# Patient Record
Sex: FEMALE | Race: WHITE | Employment: OTHER | URBAN - METROPOLITAN AREA
[De-identification: names, ages, dates, MRNs, and addresses within clinical notes are randomized per-mention and may not be internally consistent; named-entity substitution may affect disease eponyms.]

---

## 2018-09-04 ENCOUNTER — HOSPITAL ENCOUNTER (OUTPATIENT)
Age: 83
Setting detail: OBSERVATION
Discharge: HOME | End: 2018-09-05
Attending: INTERNAL MEDICINE | Admitting: INTERNAL MEDICINE
Payer: MEDICARE

## 2018-09-04 PROBLEM — I10 ACCELERATED HYPERTENSION: Status: ACTIVE | Noted: 2018-09-04

## 2018-09-04 PROCEDURE — 65660000000 HC RM CCU STEPDOWN

## 2018-09-04 PROCEDURE — 36415 COLL VENOUS BLD VENIPUNCTURE: CPT

## 2018-09-04 PROCEDURE — 84484 ASSAY OF TROPONIN QUANT: CPT

## 2018-09-04 PROCEDURE — 74011250637 HC RX REV CODE- 250/637: Performed by: NURSE PRACTITIONER

## 2018-09-04 PROCEDURE — 99218 HC RM OBSERVATION: CPT

## 2018-09-04 RX ORDER — AMLODIPINE BESYLATE 5 MG/1
5 TABLET ORAL
Status: COMPLETED | OUTPATIENT
Start: 2018-09-04 | End: 2018-09-04

## 2018-09-04 RX ORDER — ACETAMINOPHEN 325 MG/1
650 TABLET ORAL
Status: DISCONTINUED | OUTPATIENT
Start: 2018-09-04 | End: 2018-09-05 | Stop reason: HOSPADM

## 2018-09-04 RX ORDER — SODIUM CHLORIDE 0.9 % (FLUSH) 0.9 %
5-10 SYRINGE (ML) INJECTION EVERY 8 HOURS
Status: DISCONTINUED | OUTPATIENT
Start: 2018-09-04 | End: 2018-09-05 | Stop reason: HOSPADM

## 2018-09-04 RX ORDER — HYDRALAZINE HYDROCHLORIDE 50 MG/1
50 TABLET, FILM COATED ORAL 2 TIMES DAILY
Status: DISCONTINUED | OUTPATIENT
Start: 2018-09-05 | End: 2018-09-05 | Stop reason: HOSPADM

## 2018-09-04 RX ORDER — HYDRALAZINE HYDROCHLORIDE 20 MG/ML
10 INJECTION INTRAMUSCULAR; INTRAVENOUS
Status: DISCONTINUED | OUTPATIENT
Start: 2018-09-04 | End: 2018-09-05 | Stop reason: HOSPADM

## 2018-09-04 RX ORDER — LANOLIN ALCOHOL/MO/W.PET/CERES
400 CREAM (GRAM) TOPICAL 2 TIMES DAILY
Status: DISCONTINUED | OUTPATIENT
Start: 2018-09-05 | End: 2018-09-05 | Stop reason: HOSPADM

## 2018-09-04 RX ORDER — AMLODIPINE BESYLATE 5 MG/1
5 TABLET ORAL DAILY
Status: DISCONTINUED | OUTPATIENT
Start: 2018-09-05 | End: 2018-09-05 | Stop reason: HOSPADM

## 2018-09-04 RX ORDER — ONDANSETRON 2 MG/ML
4 INJECTION INTRAMUSCULAR; INTRAVENOUS
Status: DISCONTINUED | OUTPATIENT
Start: 2018-09-04 | End: 2018-09-05 | Stop reason: HOSPADM

## 2018-09-04 RX ORDER — SPIRONOLACTONE 25 MG/1
12.5 TABLET ORAL DAILY
Status: DISCONTINUED | OUTPATIENT
Start: 2018-09-05 | End: 2018-09-05 | Stop reason: HOSPADM

## 2018-09-04 RX ORDER — LOSARTAN POTASSIUM 50 MG/1
100 TABLET ORAL DAILY
Status: DISCONTINUED | OUTPATIENT
Start: 2018-09-05 | End: 2018-09-05 | Stop reason: HOSPADM

## 2018-09-04 RX ORDER — FAMOTIDINE 20 MG/1
20 TABLET, FILM COATED ORAL 2 TIMES DAILY
Status: DISCONTINUED | OUTPATIENT
Start: 2018-09-05 | End: 2018-09-05

## 2018-09-04 RX ORDER — SODIUM CHLORIDE 0.9 % (FLUSH) 0.9 %
5-10 SYRINGE (ML) INJECTION AS NEEDED
Status: DISCONTINUED | OUTPATIENT
Start: 2018-09-04 | End: 2018-09-05 | Stop reason: HOSPADM

## 2018-09-04 RX ORDER — EZETIMIBE 10 MG/1
10 TABLET ORAL DAILY
Status: DISCONTINUED | OUTPATIENT
Start: 2018-09-05 | End: 2018-09-05 | Stop reason: HOSPADM

## 2018-09-04 RX ORDER — NITROGLYCERIN 0.4 MG/1
0.4 TABLET SUBLINGUAL
Status: DISCONTINUED | OUTPATIENT
Start: 2018-09-04 | End: 2018-09-05 | Stop reason: HOSPADM

## 2018-09-04 RX ADMIN — AMLODIPINE BESYLATE 5 MG: 5 TABLET ORAL at 23:00

## 2018-09-04 RX ADMIN — Medication 5 ML: at 23:00

## 2018-09-04 NOTE — IP AVS SNAPSHOT
[address] 
[phone] Patient: Man Espinal MRN: [id number] About your hospitalization You were admitted on:  2018 You last received care in the:  [facility] 3 TELEMETRY You were discharged on:  2018 Why you were hospitalized Your primary diagnosis was:  Chest Pain Your diagnoses also included:  Palpitations, Hld (Hyperlipidemia), Benign Essential Htn, Accelerated Hypertension Follow-up Information Follow up With Details Comments Contact Info Rahul Kimball MD   [id number] [phone] Soraya Daily MD On 2018 At 230 with Dr Holli Davis on 2018 [address] [place] [id number] 
[phone] Discharge Orders None A check gudelia indicates which time of day the medication should be taken. My Medications CONTINUE taking these medications Instructions Each Dose to Equal  
 Morning Noon Evening Bedtime  
 amLODIPine 5 mg tablet Commonly known as:  Mesha Daisy Take 1 Tab by mouth daily. 5 mg Calcium-Cholecalciferol (D3) 500 mg(1,250mg) -400 unit Tab Take  by mouth. hydrALAZINE 50 mg tablet Commonly known as:  APRESOLINE Take 1 Tab by mouth two (2) times a day. 50 mg  
    
  
   
   
  
   
  
 latanoprost 0.005 % ophthalmic solution Commonly known as:  Chris Denney Administer 1 Drop to both eyes nightly. 1 Drop  
    
   
   
   
  
  
 losartan 100 mg tablet Commonly known as:  COZAAR Take 1 Tab by mouth daily. 100 mg  
    
  
   
   
   
  
 magnesium oxide 400 mg tablet Commonly known as:  MAG-OX Take 400 mg by mouth two (2) times a day. 400 mg  
    
  
   
   
  
   
  
 multivitamin tablet Commonly known as:  ONE A DAY  
   
 Take 1 Tab by mouth daily. 1 Tab  
    
  
   
   
   
  
 omega-3 fatty acids-vitamin e 1,000 mg Cap Take 1 Cap by mouth. 1 Cap PREMARIN 0.625 mg/gram vaginal cream  
Generic drug:  conjugated estrogens APPLY VAGINALLY AS DIRECTED MON, WED, FRI  
     
   
   
   
  
 raNITIdine 150 mg tablet Commonly known as:  ZANTAC Take 150 mg by mouth two (2) times a day. 150 mg  
    
  
   
   
  
   
  
 spironolactone 25 mg tablet Commonly known as:  ALDACTONE Take 0.5 Tabs by mouth daily. 12.5 mg  
    
  
   
   
   
  
 vitamin E 400 unit capsule Commonly known as:  [address] Take 400 Units by mouth daily. 400 Units ZETIA 10 mg tablet Generic drug:  ezetimibe Take 10 mg by mouth daily. 10 mg  
    
  
   
   
   
  
  
STOP taking these medications   
 aspirin delayed-release 81 mg tablet  
   
  
 metoprolol succinate 25 mg XL tablet Commonly known as:  TOPROL-XL Discharge Instructions Chest Pain: Care Instructions Your Care Instructions There are many things that can cause chest pain. Some are not serious and will get better on their own in a few days. But some kinds of chest pain need more testing and treatment. Your doctor may have recommended a follow-up visit in the next 8 to 12 hours. If you are not getting better, you may need more tests or treatment. Even though your doctor has released you, you still need to watch for any problems. The doctor carefully checked you, but sometimes problems can develop later. If you have new symptoms or if your symptoms do not get better, get medical care right away. If you have worse or different chest pain or pressure that lasts more than 5 minutes or you passed out (lost consciousness), call 911 or seek other emergency help right away. A medical visit is only one step in your treatment.  Even if you feel better, you still need to do what your doctor recommends, such as going to all suggested follow-up appointments and taking medicines exactly as directed. This will help you recover and help prevent future problems. How can you care for yourself at home? · Rest until you feel better. · Take your medicine exactly as prescribed. Call your doctor if you think you are having a problem with your medicine. · Do not drive after taking a prescription pain medicine. When should you call for help? Call 911 if: 
  · You passed out (lost consciousness).  
  · You have severe difficulty breathing.  
  · You have symptoms of a heart attack. These may include: ¨ Chest pain or pressure, or a strange feeling in your chest. 
¨ Sweating. ¨ Shortness of breath. ¨ Nausea or vomiting. ¨ Pain, pressure, or a strange feeling in your back, neck, jaw, or upper belly or in one or both shoulders or arms. ¨ Lightheadedness or sudden weakness. ¨ A fast or irregular heartbeat. After you call 911, the  may tell you to chew 1 adult-strength or 2 to 4 low-dose aspirin. Wait for an ambulance. Do not try to drive yourself.  
 Call your doctor today if: 
  · You have any trouble breathing.  
  · Your chest pain gets worse.  
  · You are dizzy or lightheaded, or you feel like you may faint.  
  · You are not getting better as expected.  
  · You are having new or different chest pain. Where can you learn more? Go to http://sherice-ryder.info/. Enter A120 in the search box to learn more about \"Chest Pain: Care Instructions. \" Current as of: November 20, 2017 Content Version: 11.7 © 6095-9631 Potential. Care instructions adapted under license by [employer] (which disclaims liability or warranty for this information).  If you have questions about a medical condition or this instruction, always ask your healthcare professional. [employer]ina [employer] disclaims any warranty or liability for your use of this information. [facility] Announcement We are excited to announce that we are making your provider's discharge notes available to you in [facility]. You will see these notes when they are completed and signed by the physician that discharged you from your recent hospital stay. If you have any questions or concerns about any information you see in [facility], please call the Health Information Department where you were seen or reach out to your Primary Care Provider for more information about your plan of care. Introducing [facility]! Dear Josef Thompson: 
Thank you for requesting a [facility] account. Our records indicate that you already have an active [facility] account. You can access your account anytime at [email or web address] Did you know that you can access your hospital and ER discharge instructions at any time in [facility]? You can also review all of your test results from your hospital stay or ER visit. Additional Information If you have questions, please visit the Frequently Asked Questions section of the [facility] website at [email or web address] Remember, [facility] is NOT to be used for urgent needs. For medical emergencies, dial 911. Now available from your iPhone and Android! Introducing Stefano Mayorga As a [employer] patient, I wanted to make you aware of our electronic visit tool called Stefano Mayorga. [employer] 24/7 allows you to connect within minutes with a medical provider 24 hours a day, seven days a week via a mobile device or tablet or logging into a secure website from your computer. You can access Stefano Mayorga from anywhere in the [place].  
 
A virtual visit might be right for you when you have a simple condition and feel like you just dont want to get out of bed, or cant get away from work for an appointment, when your regular [facility] provider is not available (evenings, weekends or holidays), or when youre out of town and need minor care. Electronic visits cost only $49 and if the [employer] [facility] 24/7 provider determines a prescription is needed to treat your condition, one can be electronically transmitted to a nearby pharmacy*. Please take a moment to enroll today if you have not already done so. The enrollment process is free and takes just a few minutes. To enroll, please download the Adfaces[employer]ciValue cecy to your tablet or phone, or visit [email or web address] to enroll on your computer. And, as an [address] [place] [address] patient with a AnyPerk account, the results of your visits will be scanned into your electronic medical record and your primary care provider will be able to view the scanned results. We urge you to continue to see your regular [facility] provider for your ongoing medical care. And while your primary care provider may not be the one available when you seek a [facility] virtual visit, the peace of mind you get from getting a real diagnosis real time can be priceless. For more information on [facility], view our Frequently Asked Questions (FAQs) at [email or web address]. Sincerely, 
 
Marie Florez MD 
Chief Medical Officer [facility] Lexie Chopra *:  certain medications cannot be prescribed via [facility] Providers Seen During Your Hospitalization Provider Specialty Primary office phone Yoko Landeros MD Cardiology [phone] Your Primary Care Physician (PCP) Primary Care Physician Office Phone Office Fax Zuleika Garibay [facility] [phone] You are allergic to the following Allergen Reactions Cephalexin Unknown (comments) Codeine Unknown (comments) Lisinopril Unknown (comments) Sulfa (Sulfonamide Antibiotics) Unknown (comments) Alfonzo (Aliskiren-Amlodipine) Unknown (comments) Recent Documentation Height Weight Breastfeeding? BMI Smoking Status 1.6 m 54.4 kg No 21.24 kg/m2 Never Smoker Emergency Contacts Name Discharge Info Relation Home Work Mobile Cipriano Cid  Daughter [21] [phone] Patient Belongings The following personal items are in your possession at time of discharge: 
  Dental Appliances: None  Visual Aid: Glasses, At bedside      Home Medications: Sent home   Jewelry: Ring, Watch, With patient  Clothing: None    Other Valuables: Cell Phone Please provide this summary of care documentation to your next provider. Signatures-by signing, you are acknowledging that this After Visit Summary has been reviewed with you and you have received a copy. Patient Signature:  ____________________________________________________________ Date:  ____________________________________________________________  
  
Naina Scruggss Provider Signature:  ____________________________________________________________ Date:  ____________________________________________________________

## 2018-09-04 NOTE — IP AVS SNAPSHOT
[address] 
[phone] Patient: Matias Trevino MRN: [id number] A check gudelia indicates which time of day the medication should be taken. My Medications CONTINUE taking these medications Instructions Each Dose to Equal  
 Morning Noon Evening Bedtime  
 amLODIPine 5 mg tablet Commonly known as:  Lennis Eagles Take 1 Tab by mouth daily. 5 mg Calcium-Cholecalciferol (D3) 500 mg(1,250mg) -400 unit Tab Take  by mouth. hydrALAZINE 50 mg tablet Commonly known as:  APRESOLINE Take 1 Tab by mouth two (2) times a day. 50 mg  
    
  
   
   
  
   
  
 latanoprost 0.005 % ophthalmic solution Commonly known as:  Ute Radha Administer 1 Drop to both eyes nightly. 1 Drop  
    
   
   
   
  
  
 losartan 100 mg tablet Commonly known as:  COZAAR Take 1 Tab by mouth daily. 100 mg  
    
  
   
   
   
  
 magnesium oxide 400 mg tablet Commonly known as:  MAG-OX Take 400 mg by mouth two (2) times a day. 400 mg  
    
  
   
   
  
   
  
 multivitamin tablet Commonly known as:  ONE A DAY Take 1 Tab by mouth daily. 1 Tab  
    
  
   
   
   
  
 omega-3 fatty acids-vitamin e 1,000 mg Cap Take 1 Cap by mouth. 1 Cap PREMARIN 0.625 mg/gram vaginal cream  
Generic drug:  conjugated estrogens APPLY VAGINALLY AS DIRECTED MON, WED, FRI  
     
   
   
   
  
 raNITIdine 150 mg tablet Commonly known as:  ZANTAC Take 150 mg by mouth two (2) times a day. 150 mg  
    
  
   
   
  
   
  
 spironolactone 25 mg tablet Commonly known as:  ALDACTONE Take 0.5 Tabs by mouth daily. 12.5 mg  
    
  
   
   
   
  
 vitamin E 400 unit capsule Commonly known as:  [address] Take 400 Units by mouth daily. 400 Units ZETIA 10 mg tablet Generic drug:  ezetimibe Take 10 mg by mouth daily. 10 mg  
    
  
   
   
   
  
  
STOP taking these medications   
 aspirin delayed-release 81 mg tablet  
   
  
 metoprolol succinate 25 mg XL tablet Commonly known as:  TOPROL-XL

## 2018-09-05 VITALS
HEART RATE: 56 BPM | HEIGHT: 63 IN | TEMPERATURE: 97.8 F | OXYGEN SATURATION: 96 % | SYSTOLIC BLOOD PRESSURE: 137 MMHG | BODY MASS INDEX: 21.25 KG/M2 | DIASTOLIC BLOOD PRESSURE: 49 MMHG | WEIGHT: 119.9 LBS | RESPIRATION RATE: 18 BRPM

## 2018-09-05 LAB
ANION GAP SERPL CALC-SCNC: 9 MMOL/L (ref 7–16)
BUN SERPL-MCNC: 21 MG/DL (ref 8–23)
CALCIUM SERPL-MCNC: 8.9 MG/DL (ref 8.3–10.4)
CHLORIDE SERPL-SCNC: 102 MMOL/L (ref 98–107)
CHOLEST SERPL-MCNC: 205 MG/DL
CO2 SERPL-SCNC: 26 MMOL/L (ref 21–32)
CREAT SERPL-MCNC: 1.22 MG/DL (ref 0.6–1)
ERYTHROCYTE [DISTWIDTH] IN BLOOD BY AUTOMATED COUNT: 12.9 %
GLUCOSE SERPL-MCNC: 86 MG/DL (ref 65–100)
HCT VFR BLD AUTO: 33.4 % (ref 35.8–46.3)
HDLC SERPL-MCNC: 61 MG/DL (ref 40–60)
HDLC SERPL: 3.4 {RATIO}
HGB BLD-MCNC: 11.3 G/DL (ref 11.7–15.4)
LDLC SERPL CALC-MCNC: 129 MG/DL
LIPID PROFILE,FLP: ABNORMAL
MCH RBC QN AUTO: 31.5 PG (ref 26.1–32.9)
MCHC RBC AUTO-ENTMCNC: 33.8 G/DL (ref 31.4–35)
MCV RBC AUTO: 93 FL (ref 79.6–97.8)
NRBC # BLD: 0 K/UL (ref 0–0.2)
PLATELET # BLD AUTO: 283 K/UL (ref 150–450)
PMV BLD AUTO: 9.9 FL (ref 9.4–12.3)
POTASSIUM SERPL-SCNC: 4 MMOL/L (ref 3.5–5.1)
RBC # BLD AUTO: 3.59 M/UL (ref 4.05–5.2)
SODIUM SERPL-SCNC: 137 MMOL/L (ref 136–145)
TRIGL SERPL-MCNC: 75 MG/DL (ref 35–150)
TROPONIN I SERPL-MCNC: 0.02 NG/ML (ref 0.02–0.05)
TROPONIN I SERPL-MCNC: <0.02 NG/ML (ref 0.02–0.05)
VLDLC SERPL CALC-MCNC: 15 MG/DL (ref 6–23)
WBC # BLD AUTO: 6.6 K/UL (ref 4.3–11.1)

## 2018-09-05 PROCEDURE — 85027 COMPLETE CBC AUTOMATED: CPT

## 2018-09-05 PROCEDURE — 80061 LIPID PANEL: CPT

## 2018-09-05 PROCEDURE — 99218 HC RM OBSERVATION: CPT

## 2018-09-05 PROCEDURE — 36415 COLL VENOUS BLD VENIPUNCTURE: CPT

## 2018-09-05 PROCEDURE — 84484 ASSAY OF TROPONIN QUANT: CPT

## 2018-09-05 PROCEDURE — 80048 BASIC METABOLIC PNL TOTAL CA: CPT

## 2018-09-05 PROCEDURE — 74011250637 HC RX REV CODE- 250/637: Performed by: NURSE PRACTITIONER

## 2018-09-05 PROCEDURE — 93306 TTE W/DOPPLER COMPLETE: CPT

## 2018-09-05 RX ORDER — FAMOTIDINE 20 MG/1
20 TABLET, FILM COATED ORAL DAILY
Status: DISCONTINUED | OUTPATIENT
Start: 2018-09-06 | End: 2018-09-05 | Stop reason: HOSPADM

## 2018-09-05 RX ADMIN — NITROGLYCERIN 1 INCH: 20 OINTMENT TOPICAL at 06:00

## 2018-09-05 RX ADMIN — Medication 400 MG: at 08:38

## 2018-09-05 RX ADMIN — SPIRONOLACTONE 12.5 MG: 25 TABLET ORAL at 08:39

## 2018-09-05 RX ADMIN — NITROGLYCERIN 1 INCH: 20 OINTMENT TOPICAL at 11:58

## 2018-09-05 RX ADMIN — EZETIMIBE 10 MG: 10 TABLET ORAL at 08:38

## 2018-09-05 RX ADMIN — Medication 5 ML: at 06:00

## 2018-09-05 RX ADMIN — Medication 10 ML: at 13:28

## 2018-09-05 RX ADMIN — NITROGLYCERIN 1 INCH: 20 OINTMENT TOPICAL at 00:00

## 2018-09-05 RX ADMIN — LOSARTAN POTASSIUM 100 MG: 50 TABLET ORAL at 08:38

## 2018-09-05 RX ADMIN — FAMOTIDINE 20 MG: 20 TABLET ORAL at 08:38

## 2018-09-05 RX ADMIN — AMLODIPINE BESYLATE 5 MG: 5 TABLET ORAL at 08:38

## 2018-09-05 RX ADMIN — HYDRALAZINE HYDROCHLORIDE 50 MG: 50 TABLET, FILM COATED ORAL at 08:38

## 2018-09-05 NOTE — DISCHARGE SUMMARY
[facility] Cardiology Discharge Summary     Patient ID:  Alton Cameron  [id number]  35 y.o.  8/17/1928    Admit date: 9/4/2018    Discharge date:  9/5/18    Admitting Physician: John Houser MD     Discharge Physician: Rito Ruggiero NP/Dr. Matthias White    Admission Diagnoses: chest pain  Chest pain    Discharge Diagnoses:   Patient Active Problem List    Diagnosis Date Noted    Accelerated hypertension 09/04/2018    Dizziness/Vertigo 07/01/2016    Benign essential HTN 05/11/2016    Edema 05/11/2016    Palpitations 05/11/2016    Chest pain 05/11/2016    HLD (hyperlipidemia)        Cardiology Procedures this admission:  EchoCardiogram  Consults: None    Hospital Course: Patient presented to the emergency department at [facility] with complaints of chest discomfort (per ER but patient denies) and palpitations. Patient reports palpitations after walking from her mailbox to her house earlier today. She states that she was seen by pre-op assessment at the hospital earlier today and felt fine. After getting home from the hospital her symptoms started and lasted about 5 mins. She reports improvement in symptoms with rest. States that she was able to take a nap and felt fine when she woke up. Unable to verify vital signs upon arrival to the ER, however, she was significantly hypertensive prior to transfer to [facility] with BP of 201/47. While in the ER, EKG showed SB with LBBB. Initial troponin was negative. She was transferred to [facility]. Upon arrival to [facility], she denies chest pain currently or in the recent past. Symptoms that are described are palpitations, \"fluttering\" in her chest, chest felt like it was racing/pounding. She reports some weakness with initial symptoms. She was admitted to telemetry with serial troponins and echo. Her troponins were all negative and echo showed hyperdynamic EF at 65-70% and mild AR.  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 Her toprol was placed on hold. The afternoon of 9/5/18, the patient was feeling much better and wanting to go home  The patient was seen and examined by Dr. Brice Rutledge and was determined stable and ready for discharge home. The patient was instructed on the importance of medication compliance and outpatient follow up. The patient will follow up with [facility] Cardiology Dr. Hardik Casas on 9/19/18 at 230 pm in [place] office. The patient's asa was stopped last week pending bladder tuck surgery on Monday. Will continue to hold asa with pending surgery and Toprol with her bradycardia. DISPOSITION: The patient is being discharged home in stable condition on a low saturated fat, low cholesterol and low salt diet. The patient is instructed to advance activities as tolerated to the limit of fatigue or shortness of breath. The patient is instructed to call the office or return to the ER for immediate evaluation for any severe shortness of breath, chest pain, prolonged palpitations, near syncope or syncope. Discharge Exam:   Visit Vitals    /49    Pulse (!) 56    Temp 97.8 °F (36.6 °C)    Resp 18    Ht 5' 3\" (1.6 m)    Wt 54.4 kg (119 lb 14.4 oz)    SpO2 96%    Breastfeeding No    BMI 21.24 kg/m2     Patient has been seen by Dr. Brice Rutledge: see his progress note for exam details.     Recent Results (from the past 24 hour(s))   TROPONIN I    Collection Time: 09/04/18 11:10 PM   Result Value Ref Range    Troponin-I, Qt. <0.02 (L) 0.02 - 0.05 NG/ML   LIPID PANEL    Collection Time: 09/05/18  3:35 AM   Result Value Ref Range    LIPID PROFILE          Cholesterol, total 205 (H) <200 MG/DL    Triglyceride 75 35 - 150 MG/DL    HDL Cholesterol 61 (H) 40 - 60 MG/DL    LDL, calculated 129 (H) <100 MG/DL    VLDL, calculated 15 6.0 - 23.0 MG/DL    CHOL/HDL Ratio 3.4     TROPONIN I    Collection Time: 09/05/18  3:35 AM   Result Value Ref Range    Troponin-I, Qt. 0.02 0.02 - 5.80 NG/ML   METABOLIC PANEL, BASIC    Collection Time: 09/05/18  3:35 AM   Result Value Ref Range    Sodium 137 136 - 145 mmol/L    Potassium 4.0 3.5 - 5.1 mmol/L    Chloride 102 98 - 107 mmol/L    CO2 26 21 - 32 mmol/L    Anion gap 9 7 - 16 mmol/L    Glucose 86 65 - 100 mg/dL    BUN 21 8 - 23 MG/DL    Creatinine 1.22 (H) 0.6 - 1.0 MG/DL    GFR est AA 53 (L) >60 ml/min/1.73m2    GFR est non-AA 44 (L) >60 ml/min/1.73m2    Calcium 8.9 8.3 - 10.4 MG/DL   CBC W/O DIFF    Collection Time: 09/05/18  3:35 AM   Result Value Ref Range    WBC 6.6 4.3 - 11.1 K/uL    RBC 3.59 (L) 4.05 - 5.2 M/uL    HGB 11.3 (L) 11.7 - 15.4 g/dL    HCT 33.4 (L) 35.8 - 46.3 %    MCV 93.0 79.6 - 97.8 FL    MCH 31.5 26.1 - 32.9 PG    MCHC 33.8 31.4 - 35.0 g/dL    RDW 12.9 %    PLATELET 199 608 - 871 K/uL    MPV 9.9 9.4 - 12.3 FL    ABSOLUTE NRBC 0.00 0.0 - 0.2 K/uL         Patient Instructions:     Current Discharge Medication List      CONTINUE these medications which have NOT CHANGED    Details   PREMARIN 0.625 mg/gram vaginal cream APPLY VAGINALLY AS DIRECTED MON, WED, FRI  Refills: 2      hydrALAZINE (APRESOLINE) 50 mg tablet Take 1 Tab by mouth two (2) times a day. Qty: 90 Tab, Refills: 3      amLODIPine (NORVASC) 5 mg tablet Take 1 Tab by mouth daily. Qty: 90 Tab, Refills: 3      spironolactone (ALDACTONE) 25 mg tablet Take 0.5 Tabs by mouth daily. Qty: 90 Tab, Refills: 3      losartan (COZAAR) 100 mg tablet Take 1 Tab by mouth daily. Qty: 90 Tab, Refills: 3      ezetimibe (ZETIA) 10 mg tablet Take 10 mg by mouth daily. latanoprost (XALATAN) 0.005 % ophthalmic solution Administer 1 Drop to both eyes nightly. ranitidine (ZANTAC) 150 mg tablet Take 150 mg by mouth two (2) times a day. vitamin E (AQUA GEMS) 400 unit capsule Take 400 Units by mouth daily. omega-3 fatty acids-vitamin e 1,000 mg cap Take 1 Cap by mouth.       Calcium-Cholecalciferol, D3, 500 mg(1,250mg) -400 unit tab Take  by mouth.      multivitamin (ONE A DAY) tablet Take 1 Tab by mouth daily. magnesium oxide (MAG-OX) 400 mg tablet Take 400 mg by mouth two (2) times a day.          STOP taking these medications       metoprolol succinate (TOPROL-XL) 25 mg XL tablet Comments:   Reason for Stopping:         aspirin delayed-release 81 mg tablet Comments:   Reason for Stopping:                 Signed:  Faby Jones NP  9/5/2018  4:26 PM

## 2018-09-05 NOTE — DISCHARGE INSTRUCTIONS
Chest Pain: Care Instructions  Your Care Instructions    There are many things that can cause chest pain. Some are not serious and will get better on their own in a few days. But some kinds of chest pain need more testing and treatment. Your doctor may have recommended a follow-up visit in the next 8 to 12 hours. If you are not getting better, you may need more tests or treatment. Even though your doctor has released you, you still need to watch for any problems. The doctor carefully checked you, but sometimes problems can develop later. If you have new symptoms or if your symptoms do not get better, get medical care right away. If you have worse or different chest pain or pressure that lasts more than 5 minutes or you passed out (lost consciousness), call 911 or seek other emergency help right away. A medical visit is only one step in your treatment. Even if you feel better, you still need to do what your doctor recommends, such as going to all suggested follow-up appointments and taking medicines exactly as directed. This will help you recover and help prevent future problems. How can you care for yourself at home? · Rest until you feel better. · Take your medicine exactly as prescribed. Call your doctor if you think you are having a problem with your medicine. · Do not drive after taking a prescription pain medicine. When should you call for help? Call 911 if:    · You passed out (lost consciousness).     · You have severe difficulty breathing.     · You have symptoms of a heart attack. These may include:  ¨ Chest pain or pressure, or a strange feeling in your chest.  ¨ Sweating. ¨ Shortness of breath. ¨ Nausea or vomiting. ¨ Pain, pressure, or a strange feeling in your back, neck, jaw, or upper belly or in one or both shoulders or arms. ¨ Lightheadedness or sudden weakness. ¨ A fast or irregular heartbeat.   After you call 911, the  may tell you to chew 1 adult-strength or 2 to 4 low-dose aspirin. Wait for an ambulance. Do not try to drive yourself.    Call your doctor today if:    · You have any trouble breathing.     · Your chest pain gets worse.     · You are dizzy or lightheaded, or you feel like you may faint.     · You are not getting better as expected.     · You are having new or different chest pain. Where can you learn more? Go to http://sherice-ryder.info/. Enter A120 in the search box to learn more about \"Chest Pain: Care Instructions. \"  Current as of: November 20, 2017  Content Version: 11.7  © 8231-5007 [employer]. Care instructions adapted under license by [employer] (which disclaims liability or warranty for this information). If you have questions about a medical condition or this instruction, always ask your healthcare professional. [address] any warranty or liability for your use of this information.

## 2018-09-05 NOTE — PROGRESS NOTES
Discharge instructions reviewed with patient. No new prescriptions. Opportunity for questions provided. Patient voiced understanding of all discharge instructions. IV(s) and heart monitor removed - heart monitor returned to monitor room.

## 2018-09-05 NOTE — PROGRESS NOTES
Dual skin assessment completed with second RN reveals scattered \"skin tags\" over body, skin is intact. Heels and sacrum visualized with no abnormalities.

## 2018-09-05 NOTE — PROGRESS NOTES
Pt admitted to 3rd floor tele for CP. CM met with pt to discuss CM needs & DCP. Pt is A&Ox4. Pt is indep at home with all ADLS. Pt lives alone, family lives nearby. Pt has the following DME: rollator, walker, cane, WC, SC, bathroom hand rails; no further DME needs. Pt has no difficulty with obtaining medications or transport. Pt has [facility] to [facility] in the past year, has also had [facility]. Declined need for HH. DCP home with family assistance. No further needs noted. CM to continue to monitor. Care Management Interventions  PCP Verified by CM: Yes  Last Visit to PCP: 08/29/18  Mode of Transport at Discharge:  Other (see comment) (Daughter Theodor Co [phone])  Transition of Care Consult (CM Consult): Discharge Planning  Discharge Durable Medical Equipment: No  Physical Therapy Consult: No  Occupational Therapy Consult: No  Speech Therapy Consult: No  Current Support Network: Lives Alone, Family Lives Nearby  Confirm Follow Up Transport: Family  Plan discussed with Pt/Family/Caregiver: Yes  Freedom of Choice Offered: Yes  Discharge Location  Discharge Placement: Home

## 2018-09-05 NOTE — PROGRESS NOTES
Pt assisted to ambulate in hallway. Pt tolerated activity well. Denies CP, SOB and dizziness. Heart rate maintained in the 60's from beginning of walk to end of walk.

## 2018-09-05 NOTE — PROGRESS NOTES
Bedside and Verbal shift change report given to Self and Clare Grey RNs (oncoming nurse) by Ace Painter RN (offgoing nurse).  Report included the following information SBAR, Kardex, Intake/Output, MAR, Recent Results and Cardiac Rhythm NSR to SB.

## 2018-09-14 ENCOUNTER — APPOINTMENT (OUTPATIENT)
Dept: GENERAL RADIOLOGY | Age: 83
End: 2018-09-14
Attending: EMERGENCY MEDICINE
Payer: MEDICARE

## 2018-09-14 ENCOUNTER — HOSPITAL ENCOUNTER (EMERGENCY)
Age: 83
Discharge: HOME | End: 2018-09-14
Attending: EMERGENCY MEDICINE
Payer: MEDICARE

## 2018-09-14 VITALS
TEMPERATURE: 98.6 F | HEART RATE: 62 BPM | HEIGHT: 63 IN | WEIGHT: 123 LBS | DIASTOLIC BLOOD PRESSURE: 68 MMHG | BODY MASS INDEX: 21.79 KG/M2 | OXYGEN SATURATION: 95 % | SYSTOLIC BLOOD PRESSURE: 151 MMHG | RESPIRATION RATE: 16 BRPM

## 2018-09-14 DIAGNOSIS — R07.9 CHEST PAIN, UNSPECIFIED TYPE: ICD-10-CM

## 2018-09-14 DIAGNOSIS — R00.2 PALPITATIONS: Primary | ICD-10-CM

## 2018-09-14 LAB
ALBUMIN SERPL-MCNC: 3.7 G/DL (ref 3.2–4.6)
ALBUMIN/GLOB SERPL: 1 {RATIO} (ref 1.2–3.5)
ALP SERPL-CCNC: 62 U/L (ref 50–136)
ALT SERPL-CCNC: 24 U/L (ref 12–65)
ANION GAP SERPL CALC-SCNC: 10 MMOL/L (ref 7–16)
AST SERPL-CCNC: 21 U/L (ref 15–37)
BASOPHILS # BLD: 0 K/UL (ref 0–0.2)
BASOPHILS NFR BLD: 1 % (ref 0–2)
BILIRUB SERPL-MCNC: 0.7 MG/DL (ref 0.2–1.1)
BUN SERPL-MCNC: 30 MG/DL (ref 8–23)
CALCIUM SERPL-MCNC: 9.3 MG/DL (ref 8.3–10.4)
CHLORIDE SERPL-SCNC: 95 MMOL/L (ref 98–107)
CO2 SERPL-SCNC: 26 MMOL/L (ref 21–32)
CREAT SERPL-MCNC: 1.6 MG/DL (ref 0.6–1)
DIFFERENTIAL METHOD BLD: ABNORMAL
EOSINOPHIL # BLD: 0.2 K/UL (ref 0–0.8)
EOSINOPHIL NFR BLD: 2 % (ref 0.5–7.8)
ERYTHROCYTE [DISTWIDTH] IN BLOOD BY AUTOMATED COUNT: 12.9 %
GLOBULIN SER CALC-MCNC: 3.7 G/DL (ref 2.3–3.5)
GLUCOSE SERPL-MCNC: 81 MG/DL (ref 65–100)
HCT VFR BLD AUTO: 35.2 % (ref 35.8–46.3)
HGB BLD-MCNC: 11.9 G/DL (ref 11.7–15.4)
IMM GRANULOCYTES # BLD: 0 K/UL (ref 0–0.5)
IMM GRANULOCYTES NFR BLD AUTO: 0 % (ref 0–5)
LYMPHOCYTES # BLD: 3.2 K/UL (ref 0.5–4.6)
LYMPHOCYTES NFR BLD: 44 % (ref 13–44)
MAGNESIUM SERPL-MCNC: 2.4 MG/DL (ref 1.8–2.4)
MCH RBC QN AUTO: 31.2 PG (ref 26.1–32.9)
MCHC RBC AUTO-ENTMCNC: 33.8 G/DL (ref 31.4–35)
MCV RBC AUTO: 92.1 FL (ref 79.6–97.8)
MONOCYTES # BLD: 0.7 K/UL (ref 0.1–1.3)
MONOCYTES NFR BLD: 9 % (ref 4–12)
NEUTS SEG # BLD: 3.3 K/UL (ref 1.7–8.2)
NEUTS SEG NFR BLD: 45 % (ref 43–78)
NRBC # BLD: 0 K/UL (ref 0–0.2)
PLATELET # BLD AUTO: 271 K/UL (ref 150–450)
PMV BLD AUTO: 9.7 FL (ref 9.4–12.3)
POTASSIUM SERPL-SCNC: 4.6 MMOL/L (ref 3.5–5.1)
PROT SERPL-MCNC: 7.4 G/DL (ref 6.3–8.2)
RBC # BLD AUTO: 3.82 M/UL (ref 4.05–5.2)
SODIUM SERPL-SCNC: 131 MMOL/L (ref 136–145)
TROPONIN I SERPL-MCNC: 0.02 NG/ML (ref 0.02–0.05)
TROPONIN I SERPL-MCNC: <0.02 NG/ML (ref 0.02–0.05)
TSH SERPL DL<=0.005 MIU/L-ACNC: 3.36 UIU/ML (ref 0.36–3.74)
WBC # BLD AUTO: 7.4 K/UL (ref 4.3–11.1)

## 2018-09-14 PROCEDURE — 99285 EMERGENCY DEPT VISIT HI MDM: CPT | Performed by: EMERGENCY MEDICINE

## 2018-09-14 PROCEDURE — 71046 X-RAY EXAM CHEST 2 VIEWS: CPT

## 2018-09-14 PROCEDURE — 96374 THER/PROPH/DIAG INJ IV PUSH: CPT | Performed by: EMERGENCY MEDICINE

## 2018-09-14 PROCEDURE — 85025 COMPLETE CBC W/AUTO DIFF WBC: CPT

## 2018-09-14 PROCEDURE — 84443 ASSAY THYROID STIM HORMONE: CPT

## 2018-09-14 PROCEDURE — 83735 ASSAY OF MAGNESIUM: CPT

## 2018-09-14 PROCEDURE — 84484 ASSAY OF TROPONIN QUANT: CPT

## 2018-09-14 PROCEDURE — 74011250636 HC RX REV CODE- 250/636: Performed by: EMERGENCY MEDICINE

## 2018-09-14 PROCEDURE — 96361 HYDRATE IV INFUSION ADD-ON: CPT | Performed by: EMERGENCY MEDICINE

## 2018-09-14 PROCEDURE — 80053 COMPREHEN METABOLIC PANEL: CPT

## 2018-09-14 PROCEDURE — 93005 ELECTROCARDIOGRAM TRACING: CPT | Performed by: EMERGENCY MEDICINE

## 2018-09-14 RX ORDER — HYDRALAZINE HYDROCHLORIDE 20 MG/ML
10 INJECTION INTRAMUSCULAR; INTRAVENOUS
Status: COMPLETED | OUTPATIENT
Start: 2018-09-14 | End: 2018-09-14

## 2018-09-14 RX ADMIN — HYDRALAZINE HYDROCHLORIDE 10 MG: 20 INJECTION INTRAMUSCULAR; INTRAVENOUS at 19:35

## 2018-09-14 RX ADMIN — SODIUM CHLORIDE 500 ML: 900 INJECTION, SOLUTION INTRAVENOUS at 19:35

## 2018-09-14 NOTE — ED TRIAGE NOTES
Per patient intermittent mediastinal chest pain that started at 1300. Patient denies gi/gu sx currently. States of sob upon movement.

## 2018-09-14 NOTE — ED PROVIDER NOTES
HPI Comments: Patient is a 77-year-old female presenting with palpitations and chest pain. She reports palpitations began approximately [id number]. She states she does have palpitations and some mild dizziness. She then developed some chest pain with radiation to her back between her shoulders. She reports some mild shortness of breath which has now since resolved. She states back pain is resolved too but has some subsisting right-sided chest pain. No leg swelling. No nausea or vomiting. Patient had an unremarkable  Echocardiogram earlier this month  and cardiac monitor. family reports they called the cardiologist who told him to come to the emergency department because they \"need a cath\". Patient is a 80 y.o. female presenting with chest pain. The history is provided by the patient. No  was used. Chest Pain (Angina) This is a new problem. The problem has been gradually improving. Pertinent negatives include no abdominal pain, no back pain, no cough, no fever, no headaches, no nausea, no shortness of breath and no vomiting. Past Medical History:  
Diagnosis Date  Benign essential HTN 5/11/2016  Chest pain 5/11/2016  Dizziness/Vertigo 7/1/2016  Edema 5/11/2016  HLD (hyperlipidemia)  Palpitations 5/11/2016 No past surgical history on file. Family History:  
Problem Relation Age of Onset  Family history unknown: Yes  
 
 
Social History Social History  Marital status:  Spouse name: N/A  
 Number of children: N/A  
 Years of education: N/A Occupational History  Not on file. Social History Main Topics  Smoking status: Never Smoker  Smokeless tobacco: Never Used  Alcohol use Not on file  Drug use: Not on file  Sexual activity: Not on file Other Topics Concern  Not on file Social History Narrative ALLERGIES: Cephalexin; Codeine;  Lisinopril; Sulfa (sulfonamide antibiotics); and Tekamlo [aliskiren-amlodipine] Review of Systems Constitutional: Negative for fatigue and fever. HENT: Negative for sore throat. Respiratory: Negative for cough, chest tightness and shortness of breath. Cardiovascular: Positive for chest pain. Negative for leg swelling. Gastrointestinal: Negative for abdominal pain, nausea and vomiting. Genitourinary: Negative for dysuria. Musculoskeletal: Negative for back pain. Neurological: Positive for light-headedness. Negative for syncope and headaches. Psychiatric/Behavioral: Negative for confusion. Vitals:  
 09/14/18 1700 BP: 153/56 Pulse: (!) 53 Resp: 18 Temp: 98.6 °F (37 °C) SpO2: 96% Weight: 55.8 kg (123 lb) Height: 5' 3\" (1.6 m) Physical Exam  
Constitutional: She is oriented to person, place, and time. She appears well-developed and well-nourished. No distress. HENT:  
Head: Normocephalic and atraumatic. Eyes: Conjunctivae and EOM are normal. Pupils are equal, round, and reactive to light. Neck: Normal range of motion. Neck supple. Cardiovascular: Normal rate, regular rhythm and normal heart sounds. Pulmonary/Chest: Effort normal and breath sounds normal. No respiratory distress. She has no wheezes. She has no rales. Abdominal: Soft. She exhibits no distension. There is no tenderness. There is no rebound. Musculoskeletal: Normal range of motion. She exhibits no edema or tenderness. Neurological: She is alert and oriented to person, place, and time. Skin: Skin is warm and dry. No rash noted. She is not diaphoretic. Psychiatric: She has a normal mood and affect. Her behavior is normal.  
Vitals reviewed. MDM Number of Diagnoses or Management Options Chest pain, unspecified type: new and does not require workup Palpitations: new and does not require workup Diagnosis management comments: Patient appears very well and is resting comfortably. She states her pain is much less than it was earlier. Occasional PVCs on monitor. Discussed case with cardiology ([address] [facility]) who evaluated the patient feels she is safe for discharge after second troponin. Patient is requesting discharge and reports she feels well enough for this. Discharged in stable condition. Return precautions discussed. Patient and family expressed understanding. Demetrice Butts MD; 9/15/2018 @2:30 AM Voice dictation software was used during the making of this note. This software is not perfect and grammatical and other typographical errors may be present. This note has not been proofread for errors. 
=================================================================== Amount and/or Complexity of Data Reviewed Clinical lab tests: reviewed and ordered (Results for orders placed or performed during the hospital encounter of 09/14/18 
-CBC WITH AUTOMATED DIFF Result                                            Value                         Ref Range WBC                                               7.4                           4.3 - 11.1 K/uL           
     RBC                                               3.82 (L)                      4.05 - 5.2 M/uL HGB                                               11.9                          11.7 - 15.4 g/dL HCT                                               35.2 (L)                      35.8 - 46.3 % MCV                                               92.1                          79.6 - 97.8 FL            
     MCH                                               31.2                          26.1 - 32.9 PG            
     MCHC                                              33.8                          31.4 - 35.0 g/dL      RDW                                               12.9                          %                         
 PLATELET                                          271                           150 - 450 K/uL MPV                                               9.7                           9.4 - 12.3 FL ABSOLUTE NRBC                                     0.00                          0.0 - 0.2 K/uL            
     DF                                                AUTOMATED NEUTROPHILS                                       45                            43 - 78 % LYMPHOCYTES                                       44                            13 - 44 % MONOCYTES                                         9                             4.0 - 12.0 % EOSINOPHILS                                       2                             0.5 - 7.8 % BASOPHILS                                         1                             0.0 - 2.0 % IMMATURE GRANULOCYTES                             0                             0.0 - 5.0 %               
     ABS. NEUTROPHILS                                  3.3                           1.7 - 8.2 K/UL            
     ABS. LYMPHOCYTES                                  3.2                           0.5 - 4.6 K/UL            
     ABS. MONOCYTES                                    0.7                           0.1 - 1.3 K/UL            
     ABS. EOSINOPHILS                                  0.2                           0.0 - 0.8 K/UL            
     ABS. BASOPHILS                                    0.0                           0.0 - 0.2 K/UL            
     ABS. IMM. GRANS.                                  0.0                           0.0 - 0.5 K/UL            
-METABOLIC PANEL, COMPREHENSIVE Result                                            Value                         Ref Range Sodium                                            131 (L)                       136 - 145 mmol/L Potassium                                         4.6                           3.5 - 5.1 mmol/L Chloride                                          95 (L)                        98 - 107 mmol/L           
     CO2                                               26                            21 - 32 mmol/L Anion gap                                         10                            7 - 16 mmol/L Glucose                                           81                            65 - 100 mg/dL BUN                                               30 (H)                        8 - 23 MG/DL Creatinine                                        1.60 (H)                      0.6 - 1.0 MG/DL           
     GFR est AA                                        39 (L)                        >60 ml/min/1.73m2 GFR est non-AA                                    32 (L)                        >60 ml/min/1.73m2 Calcium                                           9.3                           8.3 - 10.4 MG/DL Bilirubin, total                                  0.7                           0.2 - 1.1 MG/DL           
     ALT (SGPT)                                        24                            12 - 65 U/L               
     AST (SGOT)                                        21                            15 - 37 U/L Alk. phosphatase                                  62                            50 - 136 U/L Protein, total                                    7.4                           6.3 - 8.2 g/dL Albumin                                           3.7                           3.2 - 4.6 g/dL Globulin                                          3.7 (H)                       2.3 - 3.5 g/dL A-G Ratio                                         1.0 (L)                       1.2 - 3.5                 
-TROPONIN I Result                                            Value                         Ref Range Troponin-I, Qt.                                   <0.02 (L)                     0.02 - 0.05 NG/ML         
-MAGNESIUM Result                                            Value                         Ref Range Magnesium                                         2.4                           1.8 - 2.4 mg/dL           
-TSH 3RD GENERATION Result                                            Value                         Ref Range TSH                                               3.360                         0.358 - 3.740 uIU/mL      
-TROPONIN I Result                                            Value                         Ref Range Troponin-I, Qt.                                   0.02                          0.02 - 0.05 NG/ML         
-EKG, 12 LEAD, INITIAL Result                                            Value                         Ref Range Ventricular Rate                                  54                            BPM                       
     Atrial Rate                                       54                            BPM                       
     P-R Interval                                      194                           ms                        
     QRS Duration                                      132                           ms      Q-T Interval                                      480                           ms                        
     QTC Calculation (Bezet)                           455 ms                        
     Calculated P Axis                                 74                            degrees Calculated R Axis                                 -14                           degrees Calculated T Axis                                 73                            degrees Diagnosis Sinus bradycardia Possible Left atrial enlargement Left bundle branch block Abnormal ECG No previous ECGs available ) Tests in the radiology section of CPT®: ordered and reviewed (Xr Chest Pa Lat Result Date: 9/14/2018 Chest 2 view CLINICAL INDICATION: Acute substernal chest pain today intermittently with some dyspnea, symptoms worsened with movement COMPARISON: None TECHNIQUE: Upright PA and lateral views of the chest FINDINGS: Lung volumes are well inflated. Cardiac silhouette is mildly enlarged. Hilar contours are normal. Scattered aortic calcifications are not unusual for age. The lungs are clear demonstrating no consolidation, effusion, pneumothorax or CHF. No acute osseous abnormalities are seen. IMPRESSION: Cardiomegaly. No consolidation. ) Review and summarize past medical records: yes Discuss the patient with other providers: yes Independent visualization of images, tracings, or specimens: yes Risk of Complications, Morbidity, and/or Mortality Presenting problems: high Diagnostic procedures: moderate Management options: moderate Patient Progress Patient progress: improved ED Course Procedures

## 2018-09-15 LAB
ATRIAL RATE: 54 BPM
CALCULATED P AXIS, ECG09: 74 DEGREES
CALCULATED R AXIS, ECG10: -14 DEGREES
CALCULATED T AXIS, ECG11: 73 DEGREES
DIAGNOSIS, 93000: NORMAL
P-R INTERVAL, ECG05: 194 MS
Q-T INTERVAL, ECG07: 480 MS
QRS DURATION, ECG06: 132 MS
QTC CALCULATION (BEZET), ECG08: 455 MS
VENTRICULAR RATE, ECG03: 54 BPM

## 2018-09-15 NOTE — DISCHARGE INSTRUCTIONS
Cardiac Arrhythmia: Care Instructions  Your Care Instructions    A cardiac arrhythmia is a change in the normal rhythm of the heart. Your heart may beat too fast or too slow or beat with an irregular or skipping rhythm. A change in the heart's rhythm may feel like a really strong heartbeat or a fluttering in your chest. A severe heart rhythm problem can keep the body from getting the blood it needs. This can result in shortness of breath, lightheadedness, and fainting. You may take medicine to treat your condition. Your doctor may recommend a pacemaker or recommend catheter ablation to destroy small parts of the heart that are causing a rhythm problem. Another possible treatment is an implantable cardioverter-defibrillator (ICD). An ICD is a device that gives the heart a shock to return the heart to a normal rhythm. Follow-up care is a key part of your treatment and safety. Be sure to make and go to all appointments, and call your doctor if you are having problems. It's also a good idea to know your test results and keep a list of the medicines you take. How can you care for yourself at home?  [facility]    · Be safe with medicines. Take your medicines exactly as prescribed. Call your doctor if you think you are having a problem with your medicine. You will get more details on the specific medicines your doctor prescribes.     · If you received a pacemaker or an ICD, you will get a fact sheet about it.     · Wear medical alert jewelry that says you have an abnormal heart rhythm. You can buy this at most drugstores.    Lifestyle changes    · Eat a heart-healthy diet.     · Stay at a healthy weight. Lose weight if you need to.     · Avoid nicotine, too much alcohol, and illegal drugs (meth, speed, and cocaine). Also, get enough sleep and do not overeat.     · Ask your doctor whether you can take over-the-counter medicines (such as decongestants).  These can make your heart beat fast.     · Talk to your doctor about any limits to activities, such as driving, or tasks where you use power tools or ladders. Activity    · Start light exercise if your doctor says you can. Even a small amount will help you get stronger, have more energy, and manage your stress.     · Get regular exercise. Try for 30 minutes on most days of the week. Ask your doctor what level of exercise is safe for you. If activity is not likely to cause health problems, you probably do not have limits on the type or level of activity that you can do. You may want to walk, swim, bike, or do other activities.     · When you exercise, watch for signs that your heart is working too hard. You are pushing too hard if you cannot talk while you exercise. If you become short of breath or dizzy or have chest pain, sit down and rest.     · Check your pulse daily. Place two fingers on the artery at the palm side of your wrist, in line with your thumb. If your heartbeat seems uneven, talk to your doctor. When should you call for help? Call 911 anytime you think you may need emergency care. For example, call if:    · You have symptoms of sudden heart failure. These may include:  ¨ Severe trouble breathing. ¨ A fast or irregular heartbeat. ¨ Coughing up pink, foamy mucus. ¨ You passed out.     · You have signs of a stroke. These include:  ¨ Sudden numbness, paralysis, or weakness in your face, arm, or leg, especially on only one side of your body. ¨ New problems with walking or balance. ¨ Sudden vision changes. ¨ Drooling or slurred speech. ¨ New problems speaking or understanding simple statements, or feeling confused. ¨ A sudden, severe headache that is different from past headaches.    Call your doctor now or seek immediate medical care if:    · You have new or changed symptoms of heart failure, such as:  ¨ New or increased shortness of breath. ¨ New or worse swelling in your legs, ankles, or feet.   ¨ Sudden weight gain, such as more than 2 to 3 pounds in a day or 5 pounds in a week. (Your doctor may suggest a different range of weight gain.)  ¨ Feeling dizzy or lightheaded or like you may faint. ¨ Feeling so tired or weak that you cannot do your usual activities. ¨ Not sleeping well. Shortness of breath wakes you at night. You need extra pillows to prop yourself up to breathe easier.    Watch closely for changes in your health, and be sure to contact your doctor if:    · You do not get better as expected. Where can you learn more? Go to http://shericeVycloneryder.info/. Enter H237 in the search box to learn more about \"Cardiac Arrhythmia: Care Instructions. \"  Current as of: December 6, 2017  Content Version: 11.7  © 9501-6942 [employer]. Care instructions adapted under license by [employer] (which disclaims liability or warranty for this information). If you have questions about a medical condition or this instruction, always ask your healthcare professional. [place] [address] any warranty or liability for your use of this information. Chest Pain: Care Instructions  Your Care Instructions    There are many things that can cause chest pain. Some are not serious and will get better on their own in a few days. But some kinds of chest pain need more testing and treatment. Your doctor may have recommended a follow-up visit in the next 8 to 12 hours. If you are not getting better, you may need more tests or treatment. Even though your doctor has released you, you still need to watch for any problems. The doctor carefully checked you, but sometimes problems can develop later. If you have new symptoms or if your symptoms do not get better, get medical care right away. If you have worse or different chest pain or pressure that lasts more than 5 minutes or you passed out (lost consciousness), call 911 or seek other emergency help right away. A medical visit is only one step in your treatment. Even if you feel better, you still need to do what your doctor recommends, such as going to all suggested follow-up appointments and taking medicines exactly as directed. This will help you recover and help prevent future problems. How can you care for yourself at home? · Rest until you feel better. · Take your medicine exactly as prescribed. Call your doctor if you think you are having a problem with your medicine. · Do not drive after taking a prescription pain medicine. When should you call for help? Call 911 if:    · You passed out (lost consciousness).     · You have severe difficulty breathing.     · You have symptoms of a heart attack. These may include:  ¨ Chest pain or pressure, or a strange feeling in your chest.  ¨ Sweating. ¨ Shortness of breath. ¨ Nausea or vomiting. ¨ Pain, pressure, or a strange feeling in your back, neck, jaw, or upper belly or in one or both shoulders or arms. ¨ Lightheadedness or sudden weakness. ¨ A fast or irregular heartbeat. After you call 911, the  may tell you to chew 1 adult-strength or 2 to 4 low-dose aspirin. Wait for an ambulance. Do not try to drive yourself.    Call your doctor today if:    · You have any trouble breathing.     · Your chest pain gets worse.     · You are dizzy or lightheaded, or you feel like you may faint.     · You are not getting better as expected.     · You are having new or different chest pain. Where can you learn more? Go to http://sherice-ryder.info/. Enter A120 in the search box to learn more about \"Chest Pain: Care Instructions. \"  Current as of: November 20, 2017  Content Version: 11.7  © 4301-6314 [employer]. Care instructions adapted under license by [employer] (which disclaims liability or warranty for this information).  If you have questions about a medical condition or this instruction, always ask your healthcare professional. Renny Perez any warranty or liability for your use of this information.

## 2018-09-15 NOTE — ED NOTES
I have reviewed discharge instructions with the patient and caregiver. The patient and caregiver verbalized understanding. Patient left ED via Discharge Method: wheelchair to Home with transport from daughter. The patient has been wheeled to car via nurse and appears in no acute distress. The patient has been provided discharge instructions and follow up information. The patient and family do not have any questions at this time. Opportunity for questions and clarification provided. Patient given 0 scripts. To continue your aftercare when you leave the hospital, you may receive an automated call from our care team to check in on how you are doing. This is a free service and part of our promise to provide the best care and service to meet your aftercare needs.  If you have questions, or wish to unsubscribe from this service please call [phone]. Thank you for Choosing our [facility] Emergency Department.

## 2018-09-15 NOTE — ED NOTES
Lab called about TSH, trop, and mag labs. Green top went with a temp tag due to clinical collect not crossing over.

## 2018-09-15 NOTE — CONSULTS
[address] Cardiology Consult                Date of  Admission: 9/14/2018  6:01 PM     Primary Care Physician: Dr. Olivia Kennedy  Primary Cardiologist: Dr. Michelle Flower  Referring Physician: Dr. Aydee Mathews Physician: Dr. Julianna Lynne    CC/Reason for consult: CP, dizziness, palpitations      Phoebe Reina is a 80 y.o.  female with PMHx of HTN, HLD and palpitations who presented to the ED tonight after calling the  office with c/o CP. Pt had gone to [facility] for similar s/s of CP, dizziness, palpitations and her BB was stopped. She was transferred to [facility] and was admitted from 9/4/18 - 9/5/18. Work-up revealed PVCs, ECHO with LVEF 65-70% with mild MV regurg, AMI was ruled out and she was ultimately discharged with planned office f/u with Dr. Michelle Flower. She was seen for early f/u by Dr. Darius Vines on 9/7/18 due to c/o palpitations, dizziness and irregular HB. She was restarted on her BB. She called the office again today and was directed to the ED for evaluation. She states s/s started at approx 1330 with a \"heaviness\" in her chest. She reports also having palpitations and mild SOB. Pt is unable to tell if the chest heaviness occurred during the palpitations or not. Family states that she c/o dizziness on the ride here. She currently does not have any chest heaviness or SOB. She states that she is cold and that the air conditioning hitting her head makes her dizzy. She reports eating well, drinking \"fair\" and urinating normally. In ED: no new EKG changes, trop (-) x2, CXR ok, K 4.6, Mag 2.4, Na 131 (but this is improved per family), TSH 3.360.      Further chart review shows that Pt had acute hyponatremic event after taking HCTZ back in Feb 2018 requiring admission and sodium correction Reports had carotid US at OP facility in Apr/May -- unsure results  States never dx with CAD or had MI or LHC   BPs have been running higher past couple months    Patient Active Problem List   Diagnosis Code    Benign essential HTN I10    Edema R60.9    Palpitations R00.2    Chest pain R07.9    HLD (hyperlipidemia) E78.5    Dizziness/Vertigo R42    Accelerated hypertension I10       Past Medical History:   Diagnosis Date    Benign essential HTN 5/11/2016    Chest pain 5/11/2016    Dizziness/Vertigo 7/1/2016    Edema 5/11/2016    HLD (hyperlipidemia)     Palpitations 5/11/2016      No past surgical history on file. Allergies   Allergen Reactions    Cephalexin Unknown (comments)    Codeine Unknown (comments)    Lisinopril Unknown (comments)    Sulfa (Sulfonamide Antibiotics) Unknown (comments)    Tekamlo [Aliskiren-Amlodipine] Unknown (comments)      Family History   Problem Relation Age of Onset    Family history unknown: Yes        No current facility-administered medications for this encounter. Current Outpatient Prescriptions   Medication Sig    PREMARIN 0.625 mg/gram vaginal cream APPLY VAGINALLY AS DIRECTED MON, WED, FRI    hydrALAZINE (APRESOLINE) 50 mg tablet Take 1 Tab by mouth two (2) times a day.  amLODIPine (NORVASC) 5 mg tablet Take 1 Tab by mouth daily.  spironolactone (ALDACTONE) 25 mg tablet Take 0.5 Tabs by mouth daily.  losartan (COZAAR) 100 mg tablet Take 1 Tab by mouth daily.  ezetimibe (ZETIA) 10 mg tablet Take 10 mg by mouth daily.  latanoprost (XALATAN) 0.005 % ophthalmic solution Administer 1 Drop to both eyes nightly.  ranitidine (ZANTAC) 150 mg tablet Take 150 mg by mouth two (2) times a day.  vitamin E (AQUA GEMS) 400 unit capsule Take 400 Units by mouth daily.  omega-3 fatty acids-vitamin e 1,000 mg cap Take 1 Cap by mouth.  Calcium-Cholecalciferol, D3, 500 mg(1,250mg) -400 unit tab Take  by mouth.  multivitamin (ONE A DAY) tablet Take 1 Tab by mouth daily.  magnesium oxide (MAG-OX) 400 mg tablet Take 400 mg by mouth two (2) times a day. Review of Systems   Constitutional: Negative for chills, diaphoresis, fever and malaise/fatigue. HENT: Negative. Negative for congestion and tinnitus. Eyes: Negative. Negative for blurred vision and double vision. Respiratory: Positive for shortness of breath. Negative for cough, hemoptysis, sputum production, wheezing and stridor. Cardiovascular: Positive for chest pain and palpitations. Negative for orthopnea, leg swelling and PND. Gastrointestinal: Negative. Negative for abdominal pain, constipation, diarrhea, heartburn, nausea and vomiting. Genitourinary: Negative. Negative for dysuria, frequency and urgency. Musculoskeletal: Negative. Skin: Negative. Neurological: Positive for dizziness. Negative for tingling, tremors, focal weakness, seizures, loss of consciousness, weakness and headaches. Endo/Heme/Allergies: Negative. Psychiatric/Behavioral: Negative. Physical Exam  Vitals:    09/14/18 1901 09/14/18 1920 09/14/18 1935 09/14/18 2003   BP: 185/71 195/74 195/74 186/50   Pulse: (!) 51 (!) 55 (!) 57 60   Resp:    18   Temp:       SpO2: 97% 96%     Weight:       Height:           Physical Exam:  Physical Exam   Constitutional: She is oriented to person, place, and time. Vital signs are normal. She appears not lethargic, not malnourished and not jaundiced. No distress. Frail elderly female   HENT:   Head: Normocephalic and atraumatic. Nose: Nose normal.   Mouth/Throat: Oropharynx is clear and moist.   Eyes: Conjunctivae and EOM are normal. Pupils are equal, round, and reactive to light. No scleral icterus. Neck: Normal range of motion. Neck supple. No JVD present. No tracheal deviation present. Cardiovascular: Normal rate, regular rhythm, S1 normal, S2 normal and intact distal pulses. Frequent extrasystoles are present. Exam reveals no friction rub. Murmur heard. Pulses:       Carotid pulses are on the right side with bruit  Pulmonary/Chest: Effort normal and breath sounds normal. No stridor. No respiratory distress. She has no wheezes. She has no rales.    On RA   Abdominal: Soft. Bowel sounds are normal. She exhibits no distension. There is no tenderness. Musculoskeletal: Normal range of motion. She exhibits no edema. Neurological: She is alert and oriented to person, place, and time. She appears not lethargic. No cranial nerve deficit. Skin: Skin is warm and dry. She is not diaphoretic. Psychiatric: Mood, memory, affect and judgment normal.       Cardiographics    Telemetry: SR 68, LBBB with frequent PVCs  ECG: SR 54 LBBB  Echocardiogram: 9/4/18: LVEF 65-70% with mild MV regurg (eccentric)    Labs:   Recent Labs      09/14/18   1702   NA  131*   K  4.6   MG  2.4   BUN  30*   CREA  1.60*   GLU  81   WBC  7.4   HGB  11.9   HCT  35.2*   PLT  271        Assessment/Plan:     Assessment:      Principal Problem:    Chest pain -- now resolved, trop (-) x2 -- D/W Dr. Suleman Rand -- will continue meds and schedule for close f/u with Dr. Regla Mccrary in the office    Active Problems:    Benign essential HTN -- running higher recently, however HR low at times as well -- cont BB at current dose. Pt does admit that gets dizzy if she gets up too quickly -- states that she always stands for a couple extra seconds -- encouraged adequate hydration      Palpitations -- only PVCs on monitor here, cont current meds, given IVF in ED      HLD (hyperlipidemia) -- cont current meds      Dizziness/Vertigo -- adequate hydration, cont meds, rise slowly, consider repeating or obtaining recent carotid doppler results          Thank you very much for this referral. We appreciate the opportunity to participate in this patient's care. We will follow along with above stated plan.     Dexter Hanson, GABI  Consulting MD: Suleman Rand

## 2018-10-29 PROBLEM — N81.3 UTEROVAGINAL PROLAPSE, COMPLETE: Status: ACTIVE | Noted: 2018-10-29

## 2018-10-29 PROBLEM — N81.6 RECTOCELE: Status: ACTIVE | Noted: 2018-10-29

## 2018-12-05 ENCOUNTER — HOSPITAL ENCOUNTER (OUTPATIENT)
Dept: SURGERY | Age: 83
Discharge: HOME | End: 2018-12-05
Payer: MEDICARE

## 2018-12-05 VITALS
HEIGHT: 63 IN | BODY MASS INDEX: 21.84 KG/M2 | RESPIRATION RATE: 12 BRPM | WEIGHT: 123.25 LBS | DIASTOLIC BLOOD PRESSURE: 57 MMHG | OXYGEN SATURATION: 99 % | HEART RATE: 50 BPM | SYSTOLIC BLOOD PRESSURE: 178 MMHG | TEMPERATURE: 95.4 F

## 2018-12-05 LAB — SODIUM SERPL-SCNC: 125 MMOL/L (ref 136–145)

## 2018-12-05 PROCEDURE — 84295 ASSAY OF SERUM SODIUM: CPT

## 2018-12-05 RX ORDER — DICLOFENAC SODIUM 10 MG/G
GEL TOPICAL
COMMUNITY

## 2018-12-05 NOTE — PERIOP NOTES
Patient verified name and     Order for consent NOT found in EHR- unable to determine if it matches case posting; patient verified. Type 1B surgery, PAT assessment complete. Labs per surgeon: no orders in EMR  Labs per anesthesia protocol: sodium collected per Dr Faye Olmos- results pending; BMP collected 18- results found in [facility] for anesthesia reference  EKG: done 10/18/18- tracing in EMR for anesthesia reference    Pt is followed by Dr Tabatha Ovalles [facility]) for hx of palpitations, afib, LBBB- last cardiology office note with clearance 10/18/18 states \"moderate CV risk for bladder tack surgery. \" Pt with heart murmur auscultated today in PAT- ECHO 18 in EMR for anesthesia reference. Pt with hx of hyponatremia- is followed by PCP and last sodium result 18= 127. Call to Dr Faye Olmos (anesthesia) to determine if repeat lab collection needed today- Dr Faye Olmos ordered repeat sodium result today. PCP office note 18 reveals sodium results 127-129 and states \"relatively stable. \"    Hibiclens and instructions given per hospital policy. Patient provided with and instructed on educational handouts including Guide to Surgery, Pain Management, Hand Hygiene, Blood Transfusion Education, and [facility] Brochure. Patient answered medical/surgical history questions at their best of ability. All prior to admission medications documented in [facility]. Original medication prescription bottles visualized during patient appointment. Patient instructed to hold all vitamins 7 days prior to surgery and NSAIDS 5 days prior to surgery, patient verbalized understanding. Medications to be held: vitamins and diclofenac; losartan DOS, zetia DOS, spironolactone DOS.     Patient instructed to continue previous medications as prescribed prior to surgery and to take the following medications the day of surgery according to anesthesia guidelines with a small sip of water: amiodarone, amlodipine, asa 81mg, hydralazine, ranitidine. Patient teach back successful and patient demonstrates knowledge of instructions.

## 2018-12-05 NOTE — PERIOP NOTES
Sodium result remains <128- will have anesthesia review per protocol.     Recent Results (from the past 12 hour(s))   SODIUM    Collection Time: 12/05/18  1:02 PM   Result Value Ref Range    Sodium 125 (L) 136 - 145 mmol/L

## 2018-12-06 NOTE — PERIOP NOTES
Anesthesia (Tomás Pedro) reviewed chart, including most recent PCP note (11/30/18) and lab results since 5/2018 re:Na++. Dr. Chata Roblero consulted with Dr. Chaim Thompson, Anesthesiologist, President for [facility] Anesthesiology. Per Dr. Chata Roblero, anesthesia will require pt have work up with endocrinology or nephrology prior to surgery due to Na++ 125 (12/5/18) and pt's recent history of hyponatremia. This RN contacted Dr. Dickson Randall office, spoke with [facility]. Reported above information.

## 2018-12-11 NOTE — H&P
CC: Vaginal Proalpse    HPI: MsTaco is a 79yo  with vaginal proalpse. She wore a pessary for 30+ years. Dr. Rehana Gauthier was taking care of the pessary. When she took it out this last time she was told it couldn't go back in because they were afraid it would punch a hole in the bladder and she needed surgery. She was referred Alexis (GYN). With her medical history especially cardiac hx they wanted to have it done closer to [facility] where her cardiology Dr. Saritha Vick is.      Prolapse causes her to have incomplete bladder emptying and she has to splint to complete urination. It causes her a lot of pressure.     Denies leakage of urine with pessary in. She voids 5-6 times per day, 3-4 times per night and has urgency with nighttime urination. She does not have leg swelling, takes spironolactone in the morning. She denies sexual activity, no partner. Allergies:   Cephalexin  Codeine  Lisinipril  Sulfa  Tekamlo    Medications:     diclofenac sodium-menthol 1.5-10 % daily    aspirin delayed-release 81 mg tablet, daily    metoprolol succinate (TOPROL-XL) 25 mg XL tablet,    hydrALAZINE (APRESOLINE) 50 mg tablet, BID    amLODIPine (NORVASC) 5 mg tablet, daily    spironolactone (ALDACTONE) 25 mg tablet, 1/2 tab daily    losartan (COZAAR) 100 mg tablet, daily.   amiodarone (CORDARONE) 100 mg tablet, daily.   PREMARIN 0.625 mg/gram vaginal cream, M/W/    ezetimibe (ZETIA) 10 mg tablet,daily     latanoprost (XALATAN) 0.005 % ophthalmic solution, 1 Drop to both eyes nightly.   ranitidine (ZANTAC) 150 mg tablet, BID     vitamin E (AQUA GEMS) 400 unit capsule, daily    omega-3 fatty acids-vitamin e 1,000 mg cap, daily    Calcium-Cholecalciferol, D3, 500 mg(1,250mg) -400 unit tab,    multivitamin (ONE A DAY) tablet, daily    magnesium oxide (MAG-OX) 400 mg tablet, BID       PMHX:   HTN, Vertigo, Edema, Hyperlipidemia, Palpitations    PSHx: None    Family Hx: unknown family hx    Social Hx:  , never a smoker, no alcohol or drugs    ROS:  Constitutional: Negative for fatigue, fever and weight loss. Skin: Positive for itching. Negative for rash. HENT: Negative for hearing loss and vertigo. Respiratory: Negative for cough and shortness of breath. Gastrointestinal: Positive for constipation. Negative for abdominal pain, anal bleeding, diarrhea and bright red blood per rectum. Genitourinary: Positive for difficulty urinating, dysuria, enuresis, frequency, urgency, decreased urine volume and vaginal burning. Negative for dyspareunia, genital sores, hematuria, menstrual problem, pelvic pain, pelvic pressure, vaginal bleeding, vaginal discharge, vaginal pain, vaginal itching, vaginal odor and vaginal lesion. Neurological: Negative for dizziness, blackouts and headaches. Psychiatric/Behavioral: Negative for behavioral problem, nervous/anxious and insomnia. Endocrine: Negative for polydipsia and polyuria. Female Endocrine: Positive for vaginal drynessPain with intercourse and decreased libido  Cardiovascular: negative for chest pain, palpitations, and leg swelling    Visit Vitals  /74 (BP 1 Location: Right arm, BP Patient Position: Sitting)   Ht 5' 3\" (1.6 m)   Wt 127 lb (57.6 kg)   BMI 22.50 kg/m²         Physical Exam   Constitutional: She is oriented to person, place, and time. Vital signs are normal. She appears well-developed and well-nourished. She is cooperative. No distress. HENT:   Head: Normocephalic and atraumatic. Neck: Trachea normal. No thyroid mass present. Cardiovascular: Normal rate and normal heart sounds. Pulmonary/Chest: Effort normal. No accessory muscle usage. No respiratory distress. Abdominal: Soft. Normal appearance. She exhibits no distension and no mass. There is no hepatosplenomegaly. There is no tenderness. There is no rebound and no guarding. No hernia. Genitourinary: Pelvic exam was performed with patient supine.    Musculoskeletal: She exhibits no edema or tenderness. Lymphadenopathy:     She has no cervical adenopathy. She has no axillary adenopathy. Neurological: She is alert and oriented to person, place, and time. Skin: Skin is warm and dry. No lesion and no rash noted. No erythema. Psychiatric: She has a normal mood and affect. Her speech is normal and behavior is normal. Judgment and thought content normal. Cognition and memory are normal. She does not express impulsivity or inappropriate judgment.         Female Genitourinary     Vulva: - Normal. No lesions  Bartholin's Gland - Bilateral - Normal, nontender  Skenes Gland- Bilateral-Normal, nontender   Clitoris - Normal.   Introitus: Characteristics - Normal.   Urethral Meatus: - Normal appearing, normal size, no lesions, no prolapse  Urethra: No masses, no tenderness  Vagina:  + atrophy, no discharge, no lesions  Cervix: - no lesions, no discharge  Uterus: -  no tenderness, normal mobility   Adnexa: - No masses palpated, no tenderness  Bladder - no tenderness, no masses palpated  Perineum - no lesions + defect    Rectal   Anorectal Exam: - normal sphincter tone           POP-Q: (Pelvic Organ Prolapse - Quantification Exam):     +3 Aa +5 Ba +5 C   5 gh 3 pb 9 tvl   +3 Ap +4 Bp 0 D         ICS Stage                     Anterior:  Stage 3   Posterior:  Stage 3   Apical:  Stage 3            Pelvic floor muscles: Tender Spasm     Contraction     R. Puborectalis: NO 0 /5   4 /5   L. Puborectalis: NO 0 /5   4 /5   R. Pubococcyg NO 0 /5   4 /5   L. Pubococcyg NO 0 /5   4 /5   R. Ileococcyg: NO 0 /5   4 /5   L. Ileococcyg: NO 0 /5   4 /5   R. Obturator Int: NO 0 /5   4 /5   L. Obturator Int: NO 0 /5   4 /5   R. Coccygeus: NO 0 /5   4 /5   L. Coccygeus: NO 0 /5   4 /5            1.  Lower urinary tract symptoms (LUTS)  -     AMB POC URINALYSIS DIP STICK AUTO W/O MICRO     2. Uterovaginal prolapse, complete  Assessment & Plan:  We discussed the epidemiology, pathogenesis and etiology of pelvic organ prolapse. We discussed the potential risk factors which include genetics and environmental factors, such as childbirth, aging, menopause, straining, and previous surgery. I offered her management options which included nothing, pessary, and surgery.      We discussed the options of vaginal reconstruction verses obliteration for treatment of her prolapse. I explained the advantages and disadvantages of both techniques. We discussed that the colpocleisis is an extremely effective, quicker, and safer procedure to perform. I explained the technique of closing the vagina with the result being the inability to have vaginal penetration permanently. After extensive counseling regarding this, she opts to proceed with Colpocleisis.     She has a perineal defect. I described the pathogenesis of this condition which results from repaired obstetrical trauma which wither did or not heal well or became infected and broke down. I used anatomic aids to explain how this could cause her urinary, bowel, and vaginal complaints. We discussed the technique of repair with the anticipated post-operative course and restrictions of a Perineorrhaphy.     We discussed the risk of risk of [place] postoperative stress urinary incontinence after Surgery for Pelvic Organ Prolapse.      We discussed the epidemiology, pathogenesis and etiology of MARAH. We discussed the potential risk factors which include genetics and environmental factors, such as childbirth, aging, menopause, straining, and previous surgery. I offered her management options which included nothing, surgery at the time of POP surgery, or to wait and see if she develops MARAH after surgery and schedule MARAH surgery as a staged procedure. At this time the patient would like to        I will also perform a cystoscopy to evaluate her bladder anatomy.     I described the surgical technique to be employed for her upcoming surgery.  We discussed the anticipated postoperative course.     Risks, benefits, indications, and alternatives of the surgery were discussed. Risks reviewed include bleeding, infections, injury to pelvic organs (bowel, bladder, nerves, vessels and ureters), recurrence, dyspareunia, anesthetic complications, and death. The 10% risk of regret was aslo discussed. We discussed that other complications could arise and that the list is too long to discuss comprehensively.        She has consented to [facility], mid urethral sling, perineorrhaphy, and cystoscopy.              3. Rectocele  Assessment & Plan: We will do a perineorrhaphy at the time of surgery.

## 2018-12-12 NOTE — PERIOP NOTES
Pt has a new patient appt with [facility] 12/13/18 @ 1100. Charge nurse to f/u.

## 2018-12-14 NOTE — PERIOP NOTES
Dr. Hector Rizvi, anesthesia, reviewed chart - aware Dr. Jessa Gaspar had requested endocrine or nephrology clearance prior to surgery for hyponatremia. [facility] clearance note (12/13/18) and labs (12/13/18) available for review. Dr. Hector Rizvi ordered NA++ upon arrival DOS.

## 2018-12-17 ENCOUNTER — ANESTHESIA EVENT (OUTPATIENT)
Dept: SURGERY | Age: 83
End: 2018-12-17
Payer: MEDICARE

## 2018-12-18 ENCOUNTER — HOSPITAL ENCOUNTER (OUTPATIENT)
Age: 83
Discharge: HOME | End: 2018-12-19
Attending: OBSTETRICS & GYNECOLOGY | Admitting: OBSTETRICS & GYNECOLOGY
Payer: MEDICARE

## 2018-12-18 ENCOUNTER — ANESTHESIA (OUTPATIENT)
Dept: SURGERY | Age: 83
End: 2018-12-18
Payer: MEDICARE

## 2018-12-18 DIAGNOSIS — L76.82 PAIN AT SURGICAL INCISION: Primary | ICD-10-CM

## 2018-12-18 LAB — SODIUM SERPL-SCNC: 134 MMOL/L (ref 136–145)

## 2018-12-18 PROCEDURE — 77030019927 HC TBNG IRR CYSTO BAXT -A: Performed by: OBSTETRICS & GYNECOLOGY

## 2018-12-18 PROCEDURE — 74011250636 HC RX REV CODE- 250/636: Performed by: OBSTETRICS & GYNECOLOGY

## 2018-12-18 PROCEDURE — 77030020255 HC SOL INJ LR 1000ML BG

## 2018-12-18 PROCEDURE — 77030002986 HC SUT PROL J&J -A: Performed by: OBSTETRICS & GYNECOLOGY

## 2018-12-18 PROCEDURE — 77030002982 HC SUT POLYSRB J&J -A: Performed by: OBSTETRICS & GYNECOLOGY

## 2018-12-18 PROCEDURE — 77030018832 HC SOL IRR H20 ICUM -A: Performed by: OBSTETRICS & GYNECOLOGY

## 2018-12-18 PROCEDURE — 77030020782 HC GWN BAIR PAWS FLX 3M -B: Performed by: ANESTHESIOLOGY

## 2018-12-18 PROCEDURE — 74011250637 HC RX REV CODE- 250/637: Performed by: ANESTHESIOLOGY

## 2018-12-18 PROCEDURE — 74011250637 HC RX REV CODE- 250/637: Performed by: OBSTETRICS & GYNECOLOGY

## 2018-12-18 PROCEDURE — 77030034849: Performed by: OBSTETRICS & GYNECOLOGY

## 2018-12-18 PROCEDURE — 77030039266 HC ADH SKN EXOFIN S2SG -A: Performed by: OBSTETRICS & GYNECOLOGY

## 2018-12-18 PROCEDURE — 77030008703 HC TU ET UNCUF COVD -A: Performed by: ANESTHESIOLOGY

## 2018-12-18 PROCEDURE — 76210000006 HC OR PH I REC 0.5 TO 1 HR: Performed by: OBSTETRICS & GYNECOLOGY

## 2018-12-18 PROCEDURE — 77030039425 HC BLD LARYNG TRULITE DISP TELE -A: Performed by: ANESTHESIOLOGY

## 2018-12-18 PROCEDURE — 77030027138 HC INCENT SPIROMETER -A

## 2018-12-18 PROCEDURE — 77030019908 HC STETH ESOPH SIMS -A: Performed by: ANESTHESIOLOGY

## 2018-12-18 PROCEDURE — 77030005518 HC CATH URETH FOL 2W BARD -B: Performed by: OBSTETRICS & GYNECOLOGY

## 2018-12-18 PROCEDURE — 74011250636 HC RX REV CODE- 250/636: Performed by: ANESTHESIOLOGY

## 2018-12-18 PROCEDURE — 74011000250 HC RX REV CODE- 250: Performed by: OBSTETRICS & GYNECOLOGY

## 2018-12-18 PROCEDURE — 76010000132 HC OR TIME 2.5 TO 3 HR: Performed by: OBSTETRICS & GYNECOLOGY

## 2018-12-18 PROCEDURE — 76060000036 HC ANESTHESIA 2.5 TO 3 HR: Performed by: OBSTETRICS & GYNECOLOGY

## 2018-12-18 PROCEDURE — 84295 ASSAY OF SERUM SODIUM: CPT

## 2018-12-18 PROCEDURE — 77030002966 HC SUT PDS J&J -A: Performed by: OBSTETRICS & GYNECOLOGY

## 2018-12-18 PROCEDURE — 77030021052 HC RNG RETRCTR STAY COOP -A: Performed by: OBSTETRICS & GYNECOLOGY

## 2018-12-18 PROCEDURE — 74011000250 HC RX REV CODE- 250

## 2018-12-18 PROCEDURE — C1758 CATHETER, URETERAL: HCPCS | Performed by: OBSTETRICS & GYNECOLOGY

## 2018-12-18 PROCEDURE — 77030008477 HC STYL SATN SLP COVD -A: Performed by: ANESTHESIOLOGY

## 2018-12-18 PROCEDURE — 77030032490 HC SLV COMPR SCD KNE COVD -B: Performed by: OBSTETRICS & GYNECOLOGY

## 2018-12-18 PROCEDURE — 74011250636 HC RX REV CODE- 250/636

## 2018-12-18 PROCEDURE — 77030018836 HC SOL IRR NACL ICUM -A: Performed by: OBSTETRICS & GYNECOLOGY

## 2018-12-18 PROCEDURE — C1771 REP DEV, URINARY, W/SLING: HCPCS | Performed by: OBSTETRICS & GYNECOLOGY

## 2018-12-18 PROCEDURE — 77030008467 HC STPLR SKN COVD -B: Performed by: OBSTETRICS & GYNECOLOGY

## 2018-12-18 PROCEDURE — 77030008064 HC RNG RETRCTR COOP -B: Performed by: OBSTETRICS & GYNECOLOGY

## 2018-12-18 PROCEDURE — 77030020263 HC SOL INJ SOD CL0.9% LFCR 1000ML

## 2018-12-18 DEVICE — IMPLANTABLE DEVICE: Type: IMPLANTABLE DEVICE | Site: BLADDER | Status: FUNCTIONAL

## 2018-12-18 RX ORDER — FENTANYL CITRATE 50 UG/ML
25 INJECTION, SOLUTION INTRAMUSCULAR; INTRAVENOUS ONCE
Status: DISCONTINUED | OUTPATIENT
Start: 2018-12-18 | End: 2018-12-18 | Stop reason: HOSPADM

## 2018-12-18 RX ORDER — PROPOFOL 10 MG/ML
INJECTION, EMULSION INTRAVENOUS AS NEEDED
Status: DISCONTINUED | OUTPATIENT
Start: 2018-12-18 | End: 2018-12-18 | Stop reason: HOSPADM

## 2018-12-18 RX ORDER — HYDROMORPHONE HYDROCHLORIDE 2 MG/ML
0.5 INJECTION, SOLUTION INTRAMUSCULAR; INTRAVENOUS; SUBCUTANEOUS
Status: DISCONTINUED | OUTPATIENT
Start: 2018-12-18 | End: 2018-12-18 | Stop reason: HOSPADM

## 2018-12-18 RX ORDER — LIDOCAINE HYDROCHLORIDE 20 MG/ML
INJECTION, SOLUTION EPIDURAL; INFILTRATION; INTRACAUDAL; PERINEURAL AS NEEDED
Status: DISCONTINUED | OUTPATIENT
Start: 2018-12-18 | End: 2018-12-18 | Stop reason: HOSPADM

## 2018-12-18 RX ORDER — CLINDAMYCIN PHOSPHATE 600 MG/50ML
600 INJECTION INTRAVENOUS
Status: COMPLETED | OUTPATIENT
Start: 2018-12-18 | End: 2018-12-18

## 2018-12-18 RX ORDER — SODIUM CHLORIDE 0.9 % (FLUSH) 0.9 %
5-10 SYRINGE (ML) INJECTION AS NEEDED
Status: DISCONTINUED | OUTPATIENT
Start: 2018-12-18 | End: 2018-12-19 | Stop reason: HOSPADM

## 2018-12-18 RX ORDER — HEPARIN SODIUM 5000 [USP'U]/ML
5000 INJECTION, SOLUTION INTRAVENOUS; SUBCUTANEOUS EVERY 12 HOURS
Status: DISCONTINUED | OUTPATIENT
Start: 2018-12-18 | End: 2018-12-19 | Stop reason: HOSPADM

## 2018-12-18 RX ORDER — SODIUM CHLORIDE 0.9 % (FLUSH) 0.9 %
5-10 SYRINGE (ML) INJECTION AS NEEDED
Status: DISCONTINUED | OUTPATIENT
Start: 2018-12-18 | End: 2018-12-18 | Stop reason: SDUPTHER

## 2018-12-18 RX ORDER — SODIUM CHLORIDE, SODIUM LACTATE, POTASSIUM CHLORIDE, CALCIUM CHLORIDE 600; 310; 30; 20 MG/100ML; MG/100ML; MG/100ML; MG/100ML
100 INJECTION, SOLUTION INTRAVENOUS CONTINUOUS
Status: DISCONTINUED | OUTPATIENT
Start: 2018-12-18 | End: 2018-12-18 | Stop reason: HOSPADM

## 2018-12-18 RX ORDER — DIPHENHYDRAMINE HYDROCHLORIDE 50 MG/ML
12.5 INJECTION, SOLUTION INTRAMUSCULAR; INTRAVENOUS
Status: DISCONTINUED | OUTPATIENT
Start: 2018-12-18 | End: 2018-12-19 | Stop reason: HOSPADM

## 2018-12-18 RX ORDER — CLINDAMYCIN PHOSPHATE 600 MG/50ML
600 INJECTION INTRAVENOUS ONCE
Status: DISCONTINUED | OUTPATIENT
Start: 2018-12-18 | End: 2018-12-18 | Stop reason: SDUPTHER

## 2018-12-18 RX ORDER — ROCURONIUM BROMIDE 10 MG/ML
INJECTION, SOLUTION INTRAVENOUS AS NEEDED
Status: DISCONTINUED | OUTPATIENT
Start: 2018-12-18 | End: 2018-12-18 | Stop reason: HOSPADM

## 2018-12-18 RX ORDER — LIDOCAINE HYDROCHLORIDE 10 MG/ML
0.1 INJECTION INFILTRATION; PERINEURAL AS NEEDED
Status: DISCONTINUED | OUTPATIENT
Start: 2018-12-18 | End: 2018-12-18 | Stop reason: HOSPADM

## 2018-12-18 RX ORDER — ONDANSETRON 2 MG/ML
4 INJECTION INTRAMUSCULAR; INTRAVENOUS
Status: DISCONTINUED | OUTPATIENT
Start: 2018-12-18 | End: 2018-12-19 | Stop reason: HOSPADM

## 2018-12-18 RX ORDER — IBUPROFEN 400 MG/1
400 TABLET ORAL
Status: DISCONTINUED | OUTPATIENT
Start: 2018-12-18 | End: 2018-12-19 | Stop reason: HOSPADM

## 2018-12-18 RX ORDER — SODIUM CHLORIDE 9 MG/ML
INJECTION INTRAMUSCULAR; INTRAVENOUS; SUBCUTANEOUS AS NEEDED
Status: DISCONTINUED | OUTPATIENT
Start: 2018-12-18 | End: 2018-12-18 | Stop reason: HOSPADM

## 2018-12-18 RX ORDER — MIDAZOLAM HYDROCHLORIDE 1 MG/ML
2 INJECTION, SOLUTION INTRAMUSCULAR; INTRAVENOUS ONCE
Status: DISCONTINUED | OUTPATIENT
Start: 2018-12-18 | End: 2018-12-18 | Stop reason: HOSPADM

## 2018-12-18 RX ORDER — OXYCODONE HYDROCHLORIDE 5 MG/1
5 TABLET ORAL
Status: DISCONTINUED | OUTPATIENT
Start: 2018-12-18 | End: 2018-12-18 | Stop reason: HOSPADM

## 2018-12-18 RX ORDER — SODIUM CHLORIDE 9 MG/ML
125 INJECTION, SOLUTION INTRAVENOUS CONTINUOUS
Status: DISCONTINUED | OUTPATIENT
Start: 2018-12-18 | End: 2018-12-19 | Stop reason: HOSPADM

## 2018-12-18 RX ORDER — ONDANSETRON 2 MG/ML
INJECTION INTRAMUSCULAR; INTRAVENOUS AS NEEDED
Status: DISCONTINUED | OUTPATIENT
Start: 2018-12-18 | End: 2018-12-18 | Stop reason: HOSPADM

## 2018-12-18 RX ORDER — SODIUM CHLORIDE 0.9 % (FLUSH) 0.9 %
5-10 SYRINGE (ML) INJECTION EVERY 8 HOURS
Status: DISCONTINUED | OUTPATIENT
Start: 2018-12-18 | End: 2018-12-18 | Stop reason: SDUPTHER

## 2018-12-18 RX ORDER — LIDOCAINE HYDROCHLORIDE AND EPINEPHRINE 10; 10 MG/ML; UG/ML
INJECTION, SOLUTION INFILTRATION; PERINEURAL AS NEEDED
Status: DISCONTINUED | OUTPATIENT
Start: 2018-12-18 | End: 2018-12-18 | Stop reason: HOSPADM

## 2018-12-18 RX ORDER — DOCUSATE SODIUM 100 MG/1
100 CAPSULE, LIQUID FILLED ORAL 2 TIMES DAILY
Status: DISCONTINUED | OUTPATIENT
Start: 2018-12-18 | End: 2018-12-19 | Stop reason: HOSPADM

## 2018-12-18 RX ORDER — OXYCODONE AND ACETAMINOPHEN 5; 325 MG/1; MG/1
1 TABLET ORAL AS NEEDED
Status: DISCONTINUED | OUTPATIENT
Start: 2018-12-18 | End: 2018-12-18

## 2018-12-18 RX ORDER — SODIUM CHLORIDE 9 MG/ML
50 INJECTION, SOLUTION INTRAVENOUS CONTINUOUS
Status: DISCONTINUED | OUTPATIENT
Start: 2018-12-18 | End: 2018-12-18 | Stop reason: HOSPADM

## 2018-12-18 RX ORDER — FAMOTIDINE 20 MG/1
20 TABLET, FILM COATED ORAL ONCE
Status: COMPLETED | OUTPATIENT
Start: 2018-12-18 | End: 2018-12-18

## 2018-12-18 RX ORDER — ACETAMINOPHEN 325 MG/1
650 TABLET ORAL
Status: DISCONTINUED | OUTPATIENT
Start: 2018-12-18 | End: 2018-12-19 | Stop reason: HOSPADM

## 2018-12-18 RX ORDER — GENTAMICIN SULFATE 80 MG/100ML
80 INJECTION, SOLUTION INTRAVENOUS
Status: COMPLETED | OUTPATIENT
Start: 2018-12-18 | End: 2018-12-18

## 2018-12-18 RX ORDER — GABAPENTIN 300 MG/1
300 CAPSULE ORAL ONCE
Status: COMPLETED | OUTPATIENT
Start: 2018-12-18 | End: 2018-12-18

## 2018-12-18 RX ORDER — SODIUM CHLORIDE 0.9 % (FLUSH) 0.9 %
5-10 SYRINGE (ML) INJECTION EVERY 8 HOURS
Status: DISCONTINUED | OUTPATIENT
Start: 2018-12-18 | End: 2018-12-19 | Stop reason: HOSPADM

## 2018-12-18 RX ORDER — MIDAZOLAM HYDROCHLORIDE 1 MG/ML
2 INJECTION, SOLUTION INTRAMUSCULAR; INTRAVENOUS
Status: DISCONTINUED | OUTPATIENT
Start: 2018-12-18 | End: 2018-12-18 | Stop reason: HOSPADM

## 2018-12-18 RX ORDER — GLYCOPYRROLATE 0.2 MG/ML
INJECTION INTRAMUSCULAR; INTRAVENOUS AS NEEDED
Status: DISCONTINUED | OUTPATIENT
Start: 2018-12-18 | End: 2018-12-18 | Stop reason: HOSPADM

## 2018-12-18 RX ORDER — DIPHENHYDRAMINE HYDROCHLORIDE 50 MG/ML
12.5 INJECTION, SOLUTION INTRAMUSCULAR; INTRAVENOUS ONCE
Status: DISCONTINUED | OUTPATIENT
Start: 2018-12-18 | End: 2018-12-18 | Stop reason: HOSPADM

## 2018-12-18 RX ORDER — NALOXONE HYDROCHLORIDE 0.4 MG/ML
0.4 INJECTION, SOLUTION INTRAMUSCULAR; INTRAVENOUS; SUBCUTANEOUS AS NEEDED
Status: DISCONTINUED | OUTPATIENT
Start: 2018-12-18 | End: 2018-12-19 | Stop reason: HOSPADM

## 2018-12-18 RX ORDER — HYDROCODONE BITARTRATE AND ACETAMINOPHEN 5; 325 MG/1; MG/1
1 TABLET ORAL
Status: DISCONTINUED | OUTPATIENT
Start: 2018-12-18 | End: 2018-12-19 | Stop reason: HOSPADM

## 2018-12-18 RX ORDER — FENTANYL CITRATE 50 UG/ML
INJECTION, SOLUTION INTRAMUSCULAR; INTRAVENOUS AS NEEDED
Status: DISCONTINUED | OUTPATIENT
Start: 2018-12-18 | End: 2018-12-18 | Stop reason: HOSPADM

## 2018-12-18 RX ORDER — DEXAMETHASONE SODIUM PHOSPHATE 4 MG/ML
INJECTION, SOLUTION INTRA-ARTICULAR; INTRALESIONAL; INTRAMUSCULAR; INTRAVENOUS; SOFT TISSUE AS NEEDED
Status: DISCONTINUED | OUTPATIENT
Start: 2018-12-18 | End: 2018-12-18 | Stop reason: HOSPADM

## 2018-12-18 RX ORDER — NEOSTIGMINE METHYLSULFATE 1 MG/ML
INJECTION INTRAVENOUS AS NEEDED
Status: DISCONTINUED | OUTPATIENT
Start: 2018-12-18 | End: 2018-12-18 | Stop reason: HOSPADM

## 2018-12-18 RX ADMIN — SODIUM CHLORIDE, SODIUM LACTATE, POTASSIUM CHLORIDE, AND CALCIUM CHLORIDE: 600; 310; 30; 20 INJECTION, SOLUTION INTRAVENOUS at 14:12

## 2018-12-18 RX ADMIN — CLINDAMYCIN PHOSPHATE 600 MG: 600 INJECTION, SOLUTION INTRAVENOUS at 14:13

## 2018-12-18 RX ADMIN — ROCURONIUM BROMIDE 30 MG: 10 INJECTION, SOLUTION INTRAVENOUS at 14:22

## 2018-12-18 RX ADMIN — ONDANSETRON 4 MG: 2 INJECTION INTRAMUSCULAR; INTRAVENOUS at 16:33

## 2018-12-18 RX ADMIN — SODIUM CHLORIDE 125 ML/HR: 900 INJECTION, SOLUTION INTRAVENOUS at 19:58

## 2018-12-18 RX ADMIN — HEPARIN SODIUM 5000 UNITS: 5000 INJECTION INTRAVENOUS; SUBCUTANEOUS at 11:57

## 2018-12-18 RX ADMIN — GLYCOPYRROLATE 0.2 MG: 0.2 INJECTION INTRAMUSCULAR; INTRAVENOUS at 14:34

## 2018-12-18 RX ADMIN — FAMOTIDINE 20 MG: 20 TABLET ORAL at 11:37

## 2018-12-18 RX ADMIN — SODIUM CHLORIDE, SODIUM LACTATE, POTASSIUM CHLORIDE, AND CALCIUM CHLORIDE 100 ML/HR: 600; 310; 30; 20 INJECTION, SOLUTION INTRAVENOUS at 18:23

## 2018-12-18 RX ADMIN — NEOSTIGMINE METHYLSULFATE 3 MG: 1 INJECTION INTRAVENOUS at 16:27

## 2018-12-18 RX ADMIN — GABAPENTIN 300 MG: 300 CAPSULE ORAL at 11:37

## 2018-12-18 RX ADMIN — DEXAMETHASONE SODIUM PHOSPHATE 6 MG: 4 INJECTION, SOLUTION INTRA-ARTICULAR; INTRALESIONAL; INTRAMUSCULAR; INTRAVENOUS; SOFT TISSUE at 14:39

## 2018-12-18 RX ADMIN — SODIUM CHLORIDE, SODIUM LACTATE, POTASSIUM CHLORIDE, AND CALCIUM CHLORIDE: 600; 310; 30; 20 INJECTION, SOLUTION INTRAVENOUS at 15:47

## 2018-12-18 RX ADMIN — GENTAMICIN SULFATE 80 MG: 80 INJECTION, SOLUTION INTRAVENOUS at 13:09

## 2018-12-18 RX ADMIN — GLYCOPYRROLATE 0.4 MG: 0.2 INJECTION INTRAMUSCULAR; INTRAVENOUS at 16:27

## 2018-12-18 RX ADMIN — PROPOFOL 80 MG: 10 INJECTION, EMULSION INTRAVENOUS at 14:22

## 2018-12-18 RX ADMIN — LIDOCAINE HYDROCHLORIDE 40 MG: 20 INJECTION, SOLUTION EPIDURAL; INFILTRATION; INTRACAUDAL; PERINEURAL at 14:22

## 2018-12-18 RX ADMIN — FENTANYL CITRATE 50 MCG: 50 INJECTION, SOLUTION INTRAMUSCULAR; INTRAVENOUS at 14:22

## 2018-12-18 RX ADMIN — HYDROCODONE BITARTRATE AND ACETAMINOPHEN 1 TABLET: 5; 325 TABLET ORAL at 19:58

## 2018-12-18 RX ADMIN — DOCUSATE SODIUM 100 MG: 100 CAPSULE, LIQUID FILLED ORAL at 19:58

## 2018-12-18 NOTE — PERIOP NOTES
TRANSFER - OUT REPORT:    Verbal report given to 24 China Baugh RN(name) on Carley Cho  being transferred to [facility](unit) for routine progression of care       Report consisted of patients Situation, Background, Assessment and   Recommendations(SBAR). Information from the following report(s) SBAR, Kardex, OR Summary, Intake/Output, MAR and Cardiac Rhythm NSR was reviewed with the receiving nurse. Lines:   Peripheral IV 12/18/18 Left Forearm (Active)   Site Assessment Clean, dry, & intact 12/18/2018  5:27 PM   Phlebitis Assessment 0 12/18/2018  5:27 PM   Infiltration Assessment 0 12/18/2018  5:27 PM   Dressing Status Clean, dry, & intact 12/18/2018  5:27 PM   Dressing Type Transparent;Tape 12/18/2018  5:27 PM   Hub Color/Line Status Pink; Infusing 12/18/2018  5:27 PM        Opportunity for questions and clarification was provided.       Patient transported with:   O2 @ 2 liters

## 2018-12-18 NOTE — PROGRESS NOTES
TRANSFER - IN REPORT:    Verbal report received from Jessica(name) on Velvet Solano  being received from [email or web address]) for routine progression of care      Report consisted of patients Situation, Background, Assessment and   Recommendations(SBAR). Information from the following report(s) SBAR and Intake/Output was reviewed with the receiving nurse. Opportunity for questions and clarification was provided. Assessment completed upon patients arrival to unit and care assumed.

## 2018-12-18 NOTE — ANESTHESIA PREPROCEDURE EVALUATION
Anesthetic History   No history of anesthetic complications            Review of Systems / Medical History  Patient summary reviewed and pertinent labs reviewed    Pulmonary  Within defined limits                 Neuro/Psych   Within defined limits           Cardiovascular    Hypertension (severe): well controlled        Dysrhythmias : atrial fibrillation  Hyperlipidemia    Exercise tolerance: <4 METS  Comments: LBBB  On bASA  EF 65%   GI/Hepatic/Renal         Renal disease: CRI       Endo/Other             Other Findings   Comments: vertigo           Physical Exam    Airway  Mallampati: I  TM Distance: 4 - 6 cm  Neck ROM: normal range of motion   Mouth opening: Normal     Cardiovascular  Regular rate and rhythm,  S1 and S2 normal,  no murmur, click, rub, or gallop  Rhythm: regular  Rate: normal         Dental  No notable dental hx       Pulmonary  Breath sounds clear to auscultation               Abdominal  GI exam deferred       Other Findings            Anesthetic Plan    ASA: 3  Anesthesia type: general          Induction: Intravenous  Anesthetic plan and risks discussed with: Patient

## 2018-12-18 NOTE — DISCHARGE SUMMARY
Discharge Summary     Date of Admission: 12/18/18  Date of Discharge: 12/19/18  Attending Physician: Dr. Jenny Sahbazz    Admission Diagnosis: Vaginal prolapse  Discharge Diagnosis: same    Consultations: None  Procedures: [place] Colpocleisis, midurethral sling, and cystoscopy    Brief Hospital Course: Dasha Fuentes presented to the hospital for her schedule surgery. She tolerated the procedure well. She stayed in the hospital for one night. She is recovering well and is stable for discharge today.      Discharge Disposition: Stable to be discharged home  Diet: Resume previous diet

## 2018-12-18 NOTE — ANESTHESIA POSTPROCEDURE EVALUATION
Procedure(s):  [facility] COLPOCLEISIS  PERINEORRHAPHY  MIDURETHRAL SLING- GYNECARE/ [employer] ADVATAGE  CYSTOSCOPY. Anesthesia Post Evaluation      Multimodal analgesia: multimodal analgesia used between 6 hours prior to anesthesia start to PACU discharge  Patient location during evaluation: bedside  Patient participation: complete - patient participated  Level of consciousness: awake and alert  Pain score: 1  Pain management: adequate  Airway patency: patent  Anesthetic complications: no  Cardiovascular status: acceptable  Respiratory status: acceptable  Hydration status: acceptable  Comments: Patient doing well. Continue care on floor.          Visit Vitals  /60   Pulse (!) 51   Temp 36.7 °C (98 °F)   Resp 18   Ht 5' 3\" (1.6 m)   Wt 56.2 kg (124 lb)   SpO2 99%   BMI 21.97 kg/m²

## 2018-12-19 VITALS
RESPIRATION RATE: 16 BRPM | HEART RATE: 56 BPM | HEIGHT: 63 IN | SYSTOLIC BLOOD PRESSURE: 150 MMHG | WEIGHT: 124 LBS | TEMPERATURE: 98.3 F | OXYGEN SATURATION: 95 % | DIASTOLIC BLOOD PRESSURE: 56 MMHG | BODY MASS INDEX: 21.97 KG/M2

## 2018-12-19 PROCEDURE — 74011250636 HC RX REV CODE- 250/636: Performed by: OBSTETRICS & GYNECOLOGY

## 2018-12-19 PROCEDURE — 77030020263 HC SOL INJ SOD CL0.9% LFCR 1000ML

## 2018-12-19 PROCEDURE — 74011250637 HC RX REV CODE- 250/637: Performed by: OBSTETRICS & GYNECOLOGY

## 2018-12-19 PROCEDURE — 77030018846 HC SOL IRR STRL H20 ICUM -A

## 2018-12-19 RX ORDER — HYDROCODONE BITARTRATE AND ACETAMINOPHEN 5; 325 MG/1; MG/1
1 TABLET ORAL
Qty: 10 TAB | Refills: 0 | Status: SHIPPED | OUTPATIENT
Start: 2018-12-19 | End: 2019-08-06

## 2018-12-19 RX ORDER — DOCUSATE SODIUM 100 MG/1
100 CAPSULE, LIQUID FILLED ORAL 2 TIMES DAILY
Qty: 60 CAP | Refills: 2 | Status: SHIPPED | OUTPATIENT
Start: 2018-12-19 | End: 2019-03-19

## 2018-12-19 RX ORDER — POLYETHYLENE GLYCOL 3350 17 G/17G
17 POWDER, FOR SOLUTION ORAL DAILY
Status: CANCELLED | OUTPATIENT
Start: 2018-12-19

## 2018-12-19 RX ORDER — POLYETHYLENE GLYCOL 3350 17 G/17G
17 POWDER, FOR SOLUTION ORAL DAILY
Qty: 15 PACKET | Refills: 1 | Status: SHIPPED | OUTPATIENT
Start: 2018-12-19

## 2018-12-19 RX ADMIN — SODIUM CHLORIDE 125 ML/HR: 900 INJECTION, SOLUTION INTRAVENOUS at 03:01

## 2018-12-19 RX ADMIN — DOCUSATE SODIUM 100 MG: 100 CAPSULE, LIQUID FILLED ORAL at 08:25

## 2018-12-19 RX ADMIN — HEPARIN SODIUM 5000 UNITS: 5000 INJECTION INTRAVENOUS; SUBCUTANEOUS at 00:07

## 2018-12-19 NOTE — OP NOTES
Procedure:  1. LeFort Colpocleisis  2. Perineorrhaphy  3. Midurethral sling  4. Cystoscopy  5. Enterocele Repair    Pre-Op Diagnosis:  1. Complete vaginal Prolapse  2. Rectocele  3. Stress Urinary Incontinence      Post-Op Diagnosis:  1. Complete vaginal Prolapse  2. Rectocele  3. Stress Urinary Incontinence  4. Enterocele    Specimen: none  EBL: 25cc  IVF: 1300cc    Intraoperative findings: On cystoscopy at the completion of the case, there was no evidence of tumor or mass. The ureteral orifices were in normal anatomical position. There was efflux seen bilaterally. The urethra was normal. There was no evidence of injury to the bladder or urethra. There were no complications. Rectal exam was normal, no evidence of suture in the rectum or injury to the rectum. Indications for procedure: This is a 80year old female with symptomatic uterovaginal prolapse protruding beyond the opening of the vagina. She has elected to have this surgically corrected. She does have stress incontinence and is undergoing a sling for that. Procedure in detail:  The patient was brought back to the operating room. Compression boots were placed and activated. She was then sedated and general anesthesia was performed without any complications. She was positioned in the dorsal lithotomy position in [place] stirrups in a neurologically safe position. She was then prepped and draped in a sterile fashion. A Arguello catheter was placed. The cervix was grasped with a tenaculum. I then injected the anterior and posterior vagina with 1% lidocaine with epinephrine and marked out an area of excision leaving behind a 3-4 cm rim of vagina circumferentially. I then sharply excised the caginal skin in the anterior and posterior vagina as previously marked. Hemostasis was achieved with the bovie. An enterocele was evident. I used a 2-0 vicryl suture to pursestring the rectovaginal fascia to close off the enterocele.       I used a red rubber catheter to ensure drainage channels were patent throughout the case till completion. I then used a running 2-0 PDS suture to close the pubocervical fascial edge apically with the rectovaginal septum edge apically over the red rubber catheter and cervix. I then imbricated this with a 2-0 PDS running suture. I did a last row of imbricating suture using 2-0 prolene suture. This brought us to the epithelium which was then closed with a running 2-0 vicryl suture. This completed the colpocleisis. There was good support with a 3 cm long vagina. At this time a cystourethroscopy was performed with findings detailed above. Following this, the retropubic mid urethral sling was performed as follows. The anterior vaginal mucosa overlying the mid urethral region was superficially injected with 1% lidocaine and 1:100,000 epinephrine. A vertical midline incision was then made approximately 2 cm in length with a scalpel. Metzenbaum scissors were used to dissect the vaginal mucosa off the underlying fascia and bladder. The TVT needle was then placed through the vaginal tunnel, through the space of Retzius and out through the anterior abdominal wall just above the pubic symphysis. This was repeated on the contralateral side in a similar fashion. Cystourethroscopy was performed with the findings as described above. The sling was then adjusted and noted to be in the midurethral region and not under tension using a spacer. Sleeves of the mesh were then removed. Excess mesh was then trimmed at the skin. The vaginal mucosa was closed with running 2-0 Vicryl suture and this was noted to be hemostatic. Groin skin incisions were then closed with Dermabond. I then performed the perineorrhaphy. Two Allis clamps were placed along the posterior fourchette just off the midline. A third Allis clamp was placed in the posterior wall of the vagina approximately 2cm of the way up the posterior wall.  Using a scalpel, a francisco-shaped piece of perineal skin was cut between the 2 Allis clamps. Using sharp dissection the bulbocavernosus and the superficial transverse perineal muscles were dissected in a lateral fashion. Once this was done, then using 2-0 PDS suture, the lower bulbocavernosus and the superficial transverse perineal muscles were plicated in the midline in interrupted fashion x3. Once this was done, then using 2-0 Vicryl suture, the posterior wall was closed in a running fashion. The same suture was used to close the perineal skin in subcuticular fashion. Sponge, lap, needle and instrument counts were correct. The patient was repositioned in the supine position, anesthesia reversed without difficulty. The patient tolerated the procedure well and was taken to recovery in stable condition.

## 2018-12-19 NOTE — PROGRESS NOTES
Discharge instructions were reviewed with the patient and her family. Opportunity for questions given. Patient verbalized understanding of discharge and follow up instructions, as well as S/S to report to MD or return to ER for. PIV was removed. Prescriptions provided. Patient will D/C to home.

## 2018-12-19 NOTE — PROGRESS NOTES
Progress Note    Patient: Danish Reich MRN: [id number]  SSN: [id number]    YOB: 1928  Age: 80 y.o. Sex: female      Admit Date: 12/18/2018    LOS: 0 days     Subjective:     Danish Reich is doing very well this morning. She denies fevers, chills nausea, vomiting, chest pain, shortness of breath. She is ambulating, tolerating a regular diet, and pain is well controlled. She was able to void 130cc. She has no complaints, is passing gas and otherwise doing well. Objective:     Vitals:    12/18/18 2114 12/18/18 2208 12/19/18 0036 12/19/18 0459   BP: 143/54  135/52 120/64   Pulse: (!) 53  (!) 53 (!) 53   Resp: 12 20 20   Temp: 97.9 °F (36.6 °C)  98.1 °F (36.7 °C) 98.5 °F (36.9 °C)   SpO2: 95% 97% 96% 95%   Weight:       Height:            Intake and Output:  Current Shift: No intake/output data recorded. Last three shifts: 12/17 1901 - 12/19 0700  In: 2800 [I.V.:2800]  Out: 2480 [Urine:1280]    Physical Exam:   GENERAL: alert, cooperative, no distress, appears stated age  ABDOMEN: soft, non-tender. Bowel sounds normal. No masses,  no organomegaly, slightly distended but nontender in the lower pelvic area. Likely gas. She has not had a BM in days but actively passing gas. EXTREMITIES:  extremities normal, atraumatic, no cyanosis or edema, no edema, redness or tenderness in the calves or thighs  SKIN: Normal. and bruising on the labia likely from heparin and blood pooling in dependant area. Minimal blood loss during surgery. NEUROLOGIC: AOx3. Gait normal. Reflexes and motor strength normal and symmetric. Cranial nerves 2-12 and sensation grossly intact. Pad had mild spotting on it this morning. The amount was as expected. Lab/Data Review: All lab results for the last 24 hours reviewed. Assessment:     Active Problems:    Uterovaginal prolapse, complete (10/29/2018)        Plan:     Post op day # 1, s/p LeFort, Sling, Perineorrhaphy and cystp  1.  Vitals are stable  2. Pain is well controlled, continue current pain regimine  3. Encourage a normal diet  4. Encourage ambulation  5. Stable for discharge. She will follow up in 4 weeks.       Signed By: Kasia Shoemaker DO     December 19, 2018

## 2018-12-19 NOTE — PROGRESS NOTES
PM assessment complete. Alert, oriented x 4. Respirations even and unlabored, no distress noted. Abdomen soft, tender, bowel sounds hypoactive in all 4 quadrants. Arguello patent and draining. Moderate amount of blood noted to peripad, pad changed, will monitor. SCS in place. Family at bedside. Aware to call should needs arise. Will monitor.

## 2018-12-19 NOTE — PROGRESS NOTES
Approximately 50% of peripad saturated with blood, pad changed. Spoke with Dr. Rodney Romberg regarding number of pads being changed and should Heparin be given as scheduled. MD states to continue to monitor patient and give Heparin as ordered.

## 2018-12-19 NOTE — PROGRESS NOTES
Patient resting quietly. Moderate amount of blood noted to peripad. No needs voiced. Family at bedside. Aware to call should needs arise.

## 2018-12-19 NOTE — PROGRESS NOTES
Shift assessment complete. Pt resting in bed/family at bedside. A&O x4. Respirations present, even, unlabored. Lung sounds clear to auscultation. HR regular, S1&S2 auscultated. IVF infusing without difficulty. No complaints of pain at this time. Bed in lowest position, call light within reach, side rails x3. No complaints at this time. Bilateral SCD's in place. Will continue to monitor throughout the shift.

## 2019-04-18 PROBLEM — N81.6 RECTOCELE: Status: RESOLVED | Noted: 2018-10-29 | Resolved: 2019-04-18

## 2019-08-14 NOTE — PROGRESS NOTES
Bedside and Verbal shift change report given to Rere Brandon RN (oncoming nurse) by self Abron Gumaro nurse). Report included the following information SBAR, Kardex, MAR and Recent Results. Tried to call patient to schedule MWV. Calls would not go through. Tried 2 times

## 2022-03-18 PROBLEM — N81.3 UTEROVAGINAL PROLAPSE, COMPLETE: Status: ACTIVE | Noted: 2018-10-29

## 2022-03-19 PROBLEM — I10 ACCELERATED HYPERTENSION: Status: ACTIVE | Noted: 2018-09-04

## 2022-06-07 ENCOUNTER — HOSPITAL ENCOUNTER (EMERGENCY)
Dept: GENERAL RADIOLOGY | Age: 87
Discharge: HOME | End: 2022-06-10
Payer: MEDICARE

## 2022-06-07 ENCOUNTER — TELEPHONE (OUTPATIENT)
Dept: CARDIOLOGY CLINIC | Age: 87
End: 2022-06-07

## 2022-06-07 ENCOUNTER — HOSPITAL ENCOUNTER (OUTPATIENT)
Age: 87
Setting detail: OBSERVATION
Discharge: HOME | End: 2022-06-08
Attending: EMERGENCY MEDICINE | Admitting: INTERNAL MEDICINE
Payer: MEDICARE

## 2022-06-07 DIAGNOSIS — R07.9 ACUTE CHEST PAIN: ICD-10-CM

## 2022-06-07 DIAGNOSIS — R77.8 ELEVATED TROPONIN: ICD-10-CM

## 2022-06-07 DIAGNOSIS — R07.2 PRECORDIAL PAIN: Primary | ICD-10-CM

## 2022-06-07 DIAGNOSIS — I25.9 CHEST PAIN DUE TO MYOCARDIAL ISCHEMIA, UNSPECIFIED ISCHEMIC CHEST PAIN TYPE: ICD-10-CM

## 2022-06-07 PROBLEM — R00.1 SINUS BRADYCARDIA: Status: ACTIVE | Noted: 2022-06-07

## 2022-06-07 PROBLEM — I10 ACCELERATED HYPERTENSION: Status: ACTIVE | Noted: 2018-09-04

## 2022-06-07 PROBLEM — R79.89 ELEVATED TROPONIN: Status: ACTIVE | Noted: 2022-06-07

## 2022-06-07 PROBLEM — I20.0 UNSTABLE ANGINA (HCC): Status: ACTIVE | Noted: 2022-06-07

## 2022-06-07 LAB
ALBUMIN SERPL-MCNC: 3.8 G/DL (ref 3.2–4.6)
ALBUMIN/GLOB SERPL: 1.1 {RATIO} (ref 1.2–3.5)
ALP SERPL-CCNC: 84 U/L (ref 50–136)
ALT SERPL-CCNC: 25 U/L (ref 12–65)
ANION GAP SERPL CALC-SCNC: 7 MMOL/L (ref 7–16)
APTT PPP: 156.1 SEC (ref 24.1–35.1)
AST SERPL-CCNC: 20 U/L (ref 15–37)
BILIRUB SERPL-MCNC: 0.4 MG/DL (ref 0.2–1.1)
BUN SERPL-MCNC: 31 MG/DL (ref 8–23)
CALCIUM SERPL-MCNC: 8.8 MG/DL (ref 8.3–10.4)
CHLORIDE SERPL-SCNC: 96 MMOL/L (ref 98–107)
CO2 SERPL-SCNC: 26 MMOL/L (ref 21–32)
CREAT SERPL-MCNC: 2.4 MG/DL (ref 0.6–1)
ERYTHROCYTE [DISTWIDTH] IN BLOOD BY AUTOMATED COUNT: 13.5 % (ref 11.9–14.6)
GLOBULIN SER CALC-MCNC: 3.4 G/DL (ref 2.3–3.5)
GLUCOSE SERPL-MCNC: 104 MG/DL (ref 65–100)
HCT VFR BLD AUTO: 33.6 % (ref 35.8–46.3)
HGB BLD-MCNC: 11.1 G/DL (ref 11.7–15.4)
INR PPP: 1.1
LIPASE SERPL-CCNC: 390 U/L (ref 73–393)
MAGNESIUM SERPL-MCNC: 2.9 MG/DL (ref 1.8–2.4)
MCH RBC QN AUTO: 31 PG (ref 26.1–32.9)
MCHC RBC AUTO-ENTMCNC: 33 G/DL (ref 31.4–35)
MCV RBC AUTO: 93.9 FL (ref 79.6–97.8)
NRBC # BLD: 0 K/UL (ref 0–0.2)
PLATELET # BLD AUTO: 316 K/UL (ref 150–450)
PMV BLD AUTO: 9.4 FL (ref 9.4–12.3)
POTASSIUM SERPL-SCNC: 4.9 MMOL/L (ref 3.5–5.1)
PROT SERPL-MCNC: 7.2 G/DL (ref 6.3–8.2)
PROTHROMBIN TIME: 14.2 SEC (ref 12.6–14.5)
RBC # BLD AUTO: 3.58 M/UL (ref 4.05–5.2)
SODIUM SERPL-SCNC: 129 MMOL/L (ref 136–145)
TROPONIN I SERPL HS-MCNC: 24 PG/ML (ref 0–14)
TROPONIN I SERPL HS-MCNC: 38.9 PG/ML (ref 0–14)
WBC # BLD AUTO: 7.3 K/UL (ref 4.3–11.1)

## 2022-06-07 PROCEDURE — 80053 COMPREHEN METABOLIC PANEL: CPT

## 2022-06-07 PROCEDURE — 2580000003 HC RX 258: Performed by: INTERNAL MEDICINE

## 2022-06-07 PROCEDURE — 85730 THROMBOPLASTIN TIME PARTIAL: CPT

## 2022-06-07 PROCEDURE — 83690 ASSAY OF LIPASE: CPT

## 2022-06-07 PROCEDURE — 84484 ASSAY OF TROPONIN QUANT: CPT

## 2022-06-07 PROCEDURE — 2580000003 HC RX 258: Performed by: EMERGENCY MEDICINE

## 2022-06-07 PROCEDURE — 85610 PROTHROMBIN TIME: CPT

## 2022-06-07 PROCEDURE — 96376 TX/PRO/DX INJ SAME DRUG ADON: CPT

## 2022-06-07 PROCEDURE — 83735 ASSAY OF MAGNESIUM: CPT

## 2022-06-07 PROCEDURE — 96366 THER/PROPH/DIAG IV INF ADDON: CPT

## 2022-06-07 PROCEDURE — 6360000002 HC RX W HCPCS: Performed by: INTERNAL MEDICINE

## 2022-06-07 PROCEDURE — 99223 1ST HOSP IP/OBS HIGH 75: CPT | Performed by: INTERNAL MEDICINE

## 2022-06-07 PROCEDURE — 36415 COLL VENOUS BLD VENIPUNCTURE: CPT

## 2022-06-07 PROCEDURE — 85027 COMPLETE CBC AUTOMATED: CPT

## 2022-06-07 PROCEDURE — 6370000000 HC RX 637 (ALT 250 FOR IP): Performed by: INTERNAL MEDICINE

## 2022-06-07 PROCEDURE — 6370000000 HC RX 637 (ALT 250 FOR IP): Performed by: EMERGENCY MEDICINE

## 2022-06-07 PROCEDURE — G0378 HOSPITAL OBSERVATION PER HR: HCPCS

## 2022-06-07 PROCEDURE — 96365 THER/PROPH/DIAG IV INF INIT: CPT

## 2022-06-07 PROCEDURE — 93005 ELECTROCARDIOGRAM TRACING: CPT | Performed by: EMERGENCY MEDICINE

## 2022-06-07 PROCEDURE — 71046 X-RAY EXAM CHEST 2 VIEWS: CPT

## 2022-06-07 PROCEDURE — 99285 EMERGENCY DEPT VISIT HI MDM: CPT

## 2022-06-07 RX ORDER — HEPARIN SODIUM 1000 [USP'U]/ML
30 INJECTION, SOLUTION INTRAVENOUS; SUBCUTANEOUS PRN
Status: DISCONTINUED | OUTPATIENT
Start: 2022-06-07 | End: 2022-06-08

## 2022-06-07 RX ORDER — FAMOTIDINE 20 MG/1
20 TABLET, FILM COATED ORAL 2 TIMES DAILY
Status: DISCONTINUED | OUTPATIENT
Start: 2022-06-07 | End: 2022-06-08

## 2022-06-07 RX ORDER — ASPIRIN 81 MG/1
81 TABLET, CHEWABLE ORAL DAILY
Status: DISCONTINUED | OUTPATIENT
Start: 2022-06-07 | End: 2022-06-08 | Stop reason: HOSPADM

## 2022-06-07 RX ORDER — ISOSORBIDE MONONITRATE 30 MG/1
30 TABLET, EXTENDED RELEASE ORAL DAILY
Status: DISCONTINUED | OUTPATIENT
Start: 2022-06-07 | End: 2022-06-08 | Stop reason: HOSPADM

## 2022-06-07 RX ORDER — SODIUM CHLORIDE 0.9 % (FLUSH) 0.9 %
5-40 SYRINGE (ML) INJECTION PRN
Status: DISCONTINUED | OUTPATIENT
Start: 2022-06-07 | End: 2022-06-08 | Stop reason: HOSPADM

## 2022-06-07 RX ORDER — PANTOPRAZOLE SODIUM 40 MG/1
40 TABLET, DELAYED RELEASE ORAL
Status: DISCONTINUED | OUTPATIENT
Start: 2022-06-08 | End: 2022-06-08 | Stop reason: HOSPADM

## 2022-06-07 RX ORDER — NITROGLYCERIN 0.4 MG/1
0.4 TABLET SUBLINGUAL EVERY 5 MIN PRN
Status: DISCONTINUED | OUTPATIENT
Start: 2022-06-07 | End: 2022-06-08 | Stop reason: HOSPADM

## 2022-06-07 RX ORDER — SODIUM CHLORIDE 0.9 % (FLUSH) 0.9 %
5-40 SYRINGE (ML) INJECTION EVERY 12 HOURS SCHEDULED
Status: DISCONTINUED | OUTPATIENT
Start: 2022-06-07 | End: 2022-06-08 | Stop reason: HOSPADM

## 2022-06-07 RX ORDER — HYDRALAZINE HYDROCHLORIDE 25 MG/1
50 TABLET, FILM COATED ORAL 2 TIMES DAILY
Status: DISCONTINUED | OUTPATIENT
Start: 2022-06-07 | End: 2022-06-08 | Stop reason: HOSPADM

## 2022-06-07 RX ORDER — ATORVASTATIN CALCIUM 40 MG/1
40 TABLET, FILM COATED ORAL NIGHTLY
Status: DISCONTINUED | OUTPATIENT
Start: 2022-06-07 | End: 2022-06-08 | Stop reason: HOSPADM

## 2022-06-07 RX ORDER — NITROGLYCERIN 0.4 MG/1
0.4 TABLET SUBLINGUAL
Status: COMPLETED | OUTPATIENT
Start: 2022-06-07 | End: 2022-06-07

## 2022-06-07 RX ORDER — ACETAMINOPHEN 650 MG/1
650 SUPPOSITORY RECTAL EVERY 6 HOURS PRN
Status: DISCONTINUED | OUTPATIENT
Start: 2022-06-07 | End: 2022-06-08 | Stop reason: HOSPADM

## 2022-06-07 RX ORDER — ASPIRIN 81 MG/1
81 TABLET ORAL DAILY
Status: DISCONTINUED | OUTPATIENT
Start: 2022-06-08 | End: 2022-06-08 | Stop reason: SDUPTHER

## 2022-06-07 RX ORDER — ONDANSETRON 2 MG/ML
4 INJECTION INTRAMUSCULAR; INTRAVENOUS EVERY 6 HOURS PRN
Status: DISCONTINUED | OUTPATIENT
Start: 2022-06-07 | End: 2022-06-08 | Stop reason: HOSPADM

## 2022-06-07 RX ORDER — SPIRONOLACTONE 25 MG/1
12.5 TABLET ORAL DAILY
Status: DISCONTINUED | OUTPATIENT
Start: 2022-06-07 | End: 2022-06-08 | Stop reason: HOSPADM

## 2022-06-07 RX ORDER — AMIODARONE HYDROCHLORIDE 200 MG/1
100 TABLET ORAL DAILY
Status: DISCONTINUED | OUTPATIENT
Start: 2022-06-07 | End: 2022-06-08 | Stop reason: HOSPADM

## 2022-06-07 RX ORDER — EZETIMIBE 10 MG/1
10 TABLET ORAL DAILY
Status: DISCONTINUED | OUTPATIENT
Start: 2022-06-07 | End: 2022-06-08 | Stop reason: HOSPADM

## 2022-06-07 RX ORDER — LATANOPROST 50 UG/ML
1 SOLUTION/ DROPS OPHTHALMIC NIGHTLY
Status: DISCONTINUED | OUTPATIENT
Start: 2022-06-07 | End: 2022-06-08 | Stop reason: HOSPADM

## 2022-06-07 RX ORDER — AMLODIPINE BESYLATE 10 MG/1
10 TABLET ORAL DAILY
Status: DISCONTINUED | OUTPATIENT
Start: 2022-06-07 | End: 2022-06-08 | Stop reason: HOSPADM

## 2022-06-07 RX ORDER — AMIODARONE HYDROCHLORIDE 200 MG/1
100 TABLET ORAL DAILY
Status: CANCELLED | OUTPATIENT
Start: 2022-06-07

## 2022-06-07 RX ORDER — SODIUM CHLORIDE 9 MG/ML
INJECTION, SOLUTION INTRAVENOUS PRN
Status: DISCONTINUED | OUTPATIENT
Start: 2022-06-07 | End: 2022-06-08 | Stop reason: HOSPADM

## 2022-06-07 RX ORDER — SODIUM CHLORIDE 0.9 % (FLUSH) 0.9 %
3 SYRINGE (ML) INJECTION EVERY 8 HOURS
Status: DISCONTINUED | OUTPATIENT
Start: 2022-06-07 | End: 2022-06-08 | Stop reason: HOSPADM

## 2022-06-07 RX ORDER — HEPARIN SODIUM 1000 [USP'U]/ML
60 INJECTION, SOLUTION INTRAVENOUS; SUBCUTANEOUS PRN
Status: DISCONTINUED | OUTPATIENT
Start: 2022-06-07 | End: 2022-06-08

## 2022-06-07 RX ORDER — POLYETHYLENE GLYCOL 3350 17 G/17G
17 POWDER, FOR SOLUTION ORAL DAILY PRN
Status: DISCONTINUED | OUTPATIENT
Start: 2022-06-07 | End: 2022-06-08 | Stop reason: HOSPADM

## 2022-06-07 RX ORDER — ONDANSETRON 4 MG/1
4 TABLET, ORALLY DISINTEGRATING ORAL EVERY 8 HOURS PRN
Status: DISCONTINUED | OUTPATIENT
Start: 2022-06-07 | End: 2022-06-08 | Stop reason: HOSPADM

## 2022-06-07 RX ORDER — HEPARIN SODIUM 10000 [USP'U]/100ML
5-30 INJECTION, SOLUTION INTRAVENOUS CONTINUOUS
Status: DISCONTINUED | OUTPATIENT
Start: 2022-06-07 | End: 2022-06-08

## 2022-06-07 RX ORDER — ASPIRIN 81 MG/1
324 TABLET, CHEWABLE ORAL
Status: COMPLETED | OUTPATIENT
Start: 2022-06-07 | End: 2022-06-07

## 2022-06-07 RX ORDER — HEPARIN SODIUM 1000 [USP'U]/ML
60 INJECTION, SOLUTION INTRAVENOUS; SUBCUTANEOUS ONCE
Status: COMPLETED | OUTPATIENT
Start: 2022-06-07 | End: 2022-06-07

## 2022-06-07 RX ORDER — ACETAMINOPHEN 325 MG/1
650 TABLET ORAL EVERY 6 HOURS PRN
Status: DISCONTINUED | OUTPATIENT
Start: 2022-06-07 | End: 2022-06-08 | Stop reason: HOSPADM

## 2022-06-07 RX ADMIN — AMLODIPINE BESYLATE 10 MG: 10 TABLET ORAL at 21:48

## 2022-06-07 RX ADMIN — SODIUM CHLORIDE, PRESERVATIVE FREE 10 ML: 5 INJECTION INTRAVENOUS at 22:02

## 2022-06-07 RX ADMIN — ASPIRIN 324 MG: 81 TABLET, CHEWABLE ORAL at 15:50

## 2022-06-07 RX ADMIN — HEPARIN SODIUM 3290 UNITS: 1000 INJECTION INTRAVENOUS; SUBCUTANEOUS at 21:40

## 2022-06-07 RX ADMIN — SODIUM CHLORIDE, PRESERVATIVE FREE 3 ML: 5 INJECTION INTRAVENOUS at 22:02

## 2022-06-07 RX ADMIN — ISOSORBIDE MONONITRATE 30 MG: 30 TABLET, EXTENDED RELEASE ORAL at 21:49

## 2022-06-07 RX ADMIN — SODIUM CHLORIDE, PRESERVATIVE FREE 3 ML: 5 INJECTION INTRAVENOUS at 22:10

## 2022-06-07 RX ADMIN — HYDRALAZINE HYDROCHLORIDE 50 MG: 25 TABLET, FILM COATED ORAL at 21:49

## 2022-06-07 RX ADMIN — LATANOPROST 1 DROP: 50 SOLUTION/ DROPS OPHTHALMIC at 22:10

## 2022-06-07 RX ADMIN — FAMOTIDINE 20 MG: 20 TABLET, FILM COATED ORAL at 21:49

## 2022-06-07 RX ADMIN — NITROGLYCERIN 0.4 MG: 0.4 TABLET SUBLINGUAL at 15:52

## 2022-06-07 RX ADMIN — HEPARIN SODIUM AND DEXTROSE 12 UNITS/KG/HR: 10000; 5 INJECTION INTRAVENOUS at 21:42

## 2022-06-07 RX ADMIN — SPIRONOLACTONE 12.5 MG: 25 TABLET ORAL at 21:49

## 2022-06-07 RX ADMIN — EZETIMIBE 10 MG: 10 TABLET ORAL at 21:48

## 2022-06-07 RX ADMIN — ASPIRIN 81 MG: 81 TABLET, CHEWABLE ORAL at 21:48

## 2022-06-07 RX ADMIN — ATORVASTATIN CALCIUM 40 MG: 40 TABLET, FILM COATED ORAL at 21:49

## 2022-06-07 ASSESSMENT — ENCOUNTER SYMPTOMS
EYES NEGATIVE: 1
CONSTIPATION: 1
SHORTNESS OF BREATH: 0
HOARSE VOICE: 0
ABDOMINAL PAIN: 0
STRIDOR: 0
DOUBLE VISION: 0
COUGH: 0
GASTROINTESTINAL NEGATIVE: 1
WHEEZING: 0
HEMATEMESIS: 0
EYE REDNESS: 0
HEMOPTYSIS: 0
HEMATOCHEZIA: 0

## 2022-06-07 ASSESSMENT — PAIN DESCRIPTION - LOCATION
LOCATION: CHEST
LOCATION: CHEST

## 2022-06-07 ASSESSMENT — PAIN SCALES - GENERAL
PAINLEVEL_OUTOF10: 0
PAINLEVEL_OUTOF10: 4
PAINLEVEL_OUTOF10: 1

## 2022-06-07 ASSESSMENT — LIFESTYLE VARIABLES: HOW OFTEN DO YOU HAVE A DRINK CONTAINING ALCOHOL: NEVER

## 2022-06-07 ASSESSMENT — PAIN DESCRIPTION - ORIENTATION: ORIENTATION: MID

## 2022-06-07 ASSESSMENT — PAIN DESCRIPTION - DESCRIPTORS
DESCRIPTORS: HEAVINESS
DESCRIPTORS: TIGHTNESS

## 2022-06-07 NOTE — TELEPHONE ENCOUNTER
Patient's daughter called stating patient told her she feels weak and heavy in her chest.  No active chest pain. Please call and advise.

## 2022-06-07 NOTE — H&P
[facility] Cardiology Consult                Date of  Admission: 6/7/2022  2:54 PM     Primary Care Physician:  Primary Cardiologist: Dr. Regan Prater  Referring Physician:Dr. Nannette Wilburn Physician: Dr. Edgardo Perez    CC/Reason for consult: Chest pain/elevated troponin/bradycardia      vEa Doran is a 80 y.o. female admitted for chest pain with an elevated troponin. She has no known documented significant coronary artery disease but has had accelerated hypertension along with hyperlipidemia for several years.  -She has known chronic kidney disease and baseline sinus bradycardia with heart rates in the 40s which is not new for her.  -Last evening-on 6/6/2022 she developed central substernal chest tightness and pressure that appeared to be constant and lasted for close to an hour. It then subsided to some extent and she managed to fall asleep but came back again this morning. She checked her heart rates and they were running in the 30s range and at 1 time had reached 27 bpm.  She called the office and she was directed to the ER for further management. She came to the ER and by the time she got here, her chest tightness had gone away completely. There was no associated radiation. She had some mild associated dyspnea but no diaphoresis. She denied any associated abdominal pain or bloating which she had constipation issues just in the last few days for which she took MiraLAX and it resolved. They gave her 2 sublingual nitroglycerin pills here in the ER as when she came in, her blood pressures were in the 180s range and this seemed to have helped her blood pressures. She says she has been compliant with all her antihypertensive therapy. She has some chronic lower extremity edema but denies any significant worsening of this. Denies orthopnea or PND. Says she sleeps fairly flat in bed and most often on her left side.     Denies any significant palpitations or presyncope or syncope or TIAs or strokelike symptoms.  -On amiodarone at home    Patient Active Problem List   Diagnosis    Palpitations    Uterovaginal prolapse, complete    Accelerated hypertension    Dizziness    Precordial pain    HLD (hyperlipidemia)    Edema    Elevated troponin    Sinus bradycardia       Past Medical History:   Diagnosis Date    Atrial fibrillation ([address]     EKG 10/18/18- sinus bradycardia; followed by Dr Alexsander Cole ([facility] [place] wore holter monitor 9/2018 for palpitations    Benign essential HTN 5/11/2016    Bradycardia     rate usually 47-52    Chest pain 05/11/2016    no current symptoms 12/5/18    CKD (chronic kidney disease), stage IV (HCC)     creatinine result 1.6 (11/30/18); followed by PCP    Dizziness 7/1/2016    Edema 05/11/2016    hx of- pt reports no edema currently (12/5/18)    HLD (hyperlipidemia)     Hyponatremia     last hospitalization 2/2018- granddaughter reports r/t medicine at time; PCP reports \"relatively stable\" bet 127-129    LBBB (left bundle branch block)     Palpitations 5/11/2016    Rectocele     Uterovaginal prolapse       Past Surgical History:   Procedure Laterality Date    CYST REMOVAL  1962    left breast    GYN  12/18/2018    LeFort colpocleisis, midurethral sling and cystoscopy- Dr. Jessica Thayer   Allergen Reactions    Cephalexin Other (See Comments)    Codeine Other (See Comments)    Lisinopril Other (See Comments)    Sulfa Antibiotics Other (See Comments)      No family history on file.      Current Facility-Administered Medications   Medication Dose Route Frequency    sodium chloride flush 0.9 % injection 3 mL  3 mL IntraVENous Q8H     Current Outpatient Medications   Medication Sig    amiodarone (CORDARONE) 200 MG tablet Take 100 mg by mouth daily    amLODIPine (NORVASC) 10 MG tablet Take 10 mg by mouth daily    aspirin 81 MG EC tablet Take by mouth daily    Calcium Carb-Cholecalciferol (OYSTER SHELL CALCIUM) 500-400 MG-UNIT TABS Take by mouth    cimetidine (TAGAMET) 200 MG tablet Take 400 mg by mouth 2 times daily    diclofenac sodium (VOLTAREN) 1 % GEL Apply topically 3 times daily as needed    ezetimibe (ZETIA) 10 MG tablet Take 10 mg by mouth daily    furosemide (LASIX) 40 MG tablet Take 20 mg by mouth daily    hydrALAZINE (APRESOLINE) 50 MG tablet TAKE 1 TABLET BY MOUTH TWICE A DAY    latanoprost (XALATAN) 0.005 % ophthalmic solution Apply 1 drop to eye    magnesium oxide (MAG-OX) 400 (240 Mg) MG tablet Take 400 mg by mouth 2 times daily    metoprolol tartrate (LOPRESSOR) 25 MG tablet Take 25 mg by mouth 2 times daily    omeprazole (PRILOSEC OTC) 20 MG tablet Take 20 mg by mouth daily    polyethylene glycol (GLYCOLAX) 17 GM/SCOOP powder Take 17 g by mouth daily    spironolactone (ALDACTONE) 25 MG tablet TAKE 1/2 TABLET BY MOUTH DAILY    vitamin E 400 UNIT capsule Take 400 Units by mouth daily       Review of Systems   Constitutional: Negative for chills and fever. HENT: Negative for ear discharge, hoarse voice and stridor. Eyes: Negative for double vision and redness. Cardiovascular: Positive for chest pain and leg swelling. Negative for cyanosis and syncope. Respiratory: Negative for hemoptysis and wheezing. Endocrine: Negative for polydipsia and polyphagia. Hematologic/Lymphatic: Negative for adenopathy. Skin: Negative for itching and rash. Musculoskeletal: Negative for joint swelling and muscle weakness. Gastrointestinal: Positive for constipation. Negative for abdominal pain, hematemesis and hematochezia. Genitourinary: Negative for flank pain and nocturia. Neurological: Negative for focal weakness and seizures. Psychiatric/Behavioral: Negative for altered mental status and suicidal ideas. Allergic/Immunologic: Negative for hives.         Physical Exam  Vitals:    06/07/22 1415 06/07/22 1813 06/07/22 1828 06/07/22 1843   BP: (!) 165/61 (!) 184/43 (!) 185/45 (!) 182/44   Pulse: (!) 43      Resp: 16      Temp: 97.9 °F (36.6 °C)      TempSrc: Oral      SpO2: 100% 95% 96% 95%   Weight: 121 lb (54.9 kg)      Height: 5' 1\" (1.549 m)          Physical Exam:  General: Well Developed, Well Nourished, No Acute Distress  Head: Normocephalic, atraumatic  ENT: pupils equal and round, no abnormalities noted  Neck: supple, no JVD, no carotid bruits  Heart: S1, S2 with regular rhythm, bradycardic with heart rates in the 40s without murmurs or gallops  Lungs: Clear throughout auscultation bilaterally without adventitious sounds  Abd: soft, nontender, nondistended, with good bowel sounds  Ext: warm, no edema, calves supple/nontender, pulses 2+ bilaterally  Skin: No obvious rashes noted on the exposed regions. Psychiatric: Normal mood and affect  Neurologic: Alert and oriented X 3, no gross motor or neurological deficits noted. Peripheral pulses: Well felt in all 4 extremities. Cardiographics  Telemetry-sinus bradycardia  ECG: Sinus bradycardia with poor R wave progression and first-degree AV block  Echocardiogram: not done yet. Previous echo showed an EF at 65 to 70%    Labs:   Recent Labs     06/07/22  1432   *   K 4.9   MG 2.9*   BUN 31*   WBC 7.3   HGB 11.1*   HCT 33.6*          High-sensitivity troponin of 0.39-was 0.24 initially   Assessment/Plan:     Assessment:      Principal Problem:    Precordial pain/concern for unstable angina  -Started aspirin, had to hold beta-blocker with significant marked bradycardia with heart rates as low as 27 bpm.  Has first-degree AV block. Monitor. Continue amlodipine. Start long-acting nitrate isosorbide dinitrate 10 mg twice daily. Active Problems:    Elevated troponin/-borderline elevation noted.  -Could be from demand ischemia with hypertensive urgency with underlying chronic kidney disease. Getting echocardiogram to review LV function. Sinus bradycardia  -Being monitored. Continue low-dose amiodarone but stopped metoprolol for now.       Palpitations  -Continue low-dose amiodarone 100 mg once daily and stopped metoprolol      Accelerated hypertension  -Significantly elevated blood pressures [id number] antihypertensive therapy-Home meds including amlodipine 10 mg once daily, spironolactone 12.5 mg once daily, Lasix 20 mg daily but held metoprolol. HLD (hyperlipidemia)  -Not on treatment at this point. Will initiate atorvastatin 40 mg daily      Edema  -Has a history of dependent edema. Rechecking echo tomorrow morning    Chronic kidney disease  -Monitor    -Plan: As mentioned above-Per ACS protocol with IV unfractionated heparin, aspirin. Holding off on beta-blocker due to bradycardia. No ACE inhibitor/ARB with previous allergy and chronic kidney disease. Thank you very much for this referral. We appreciate the opportunity to participate in this patient's care. We will follow along with above stated plan. -Keep n.p.o. after midnight if cardiac markers significantly trend upwards.-Checking echocardiogram.  -She would like to follow a conservative path and avoid coronary angiography if possible but understands the need to proceed with this if needed.  -Continuous telemetry.  -Further plans per clinical course.     Denice Peterson MD  Consulting MD:

## 2022-06-07 NOTE — TELEPHONE ENCOUNTER
Osacr Mccauley returns call. She states pt still having heaviness in her chest, weakness and fatigue. HR is 34 bpm. Triage advised Oscar Mccauley to take pt to [facility] [place] for evaluation now and if needed or pt's symptoms worsen, call 911. She verbalizes understanding and agrees to plan.

## 2022-06-07 NOTE — ED PROVIDER NOTES
[employer] Emergency Department Provider Note                   PCP:                Yosef Seay MD               Age: 80 y.o. Sex: female     No diagnosis found. DISPOSITION         New Prescriptions    No medications on file       Orders Placed This Encounter   Procedures    XR CHEST (2 VW)    Troponin    CBC    Comprehensive Metabolic Panel    Lipase    Magnesium    Cardiac Monitor    Pulse Oximetry    EKG 12 Lead    Saline lock IV        MDM  Number of Diagnoses or Management Options  Diagnosis management comments: Very young appearing 80year old with chest pain consistent with angina. Aspirin 324 mg po, Nitroglycerin 0.4 x 2 - pain relieved  CXR clear lungs - I viewed images  Labs reviewed  Troponin 24 - to 38.9  Consult Cardiology         Amount and/or Complexity of Data Reviewed  Clinical lab tests: ordered and reviewed  Tests in the radiology section of CPT®: ordered and reviewed  Decide to obtain previous medical records or to obtain history from someone other than the patient: yes  Obtain history from someone other than the patient: yes (granddaughter)  Review and summarize past medical records: yes  Discuss the patient with other providers: yes (Betty Hayes)  Independent visualization of images, tracings, or specimens: yes    Patient Progress  Patient progress: stable       Benson Sadler is a 80 y.o. female who presents to the Emergency Department with chief complaint of    Chief Complaint   Patient presents with    Chest Pain      Is a 80year-old female who complains of tightness in her chest this started last night and lasted 1 to 2 hours and resolved spontaneously. This morning she had pain beginning at 7 AM.  On the way to the emergency department it resolved. Although she does say she still has a little bit of tightness and pressure. No radiation of pain. No shortness of breath nausea or diaphoresis. No cough or fever.   Her granddaughter also states that her heart rate has been slow. Her pulse was down to 34 today when they took it. She denies fainting. She has felt fatigue. She has had a history of low sodium in the past.  She has not taken any Lasix recently. She sees Dr. Smita Gregg for hypertension. She is on metoprolol. She has never had a cardiac catheterization. She has had a stress test in the distant past.          All other systems reviewed and are negative. Review of Systems   Constitutional: Positive for fatigue. Negative for chills and fever. HENT: Negative. Eyes: Negative. Respiratory: Negative for cough and shortness of breath. Cardiovascular: Positive for chest pain and leg swelling. Negative for palpitations. Gastrointestinal: Negative. Genitourinary: Negative. Musculoskeletal: Negative. Skin: Negative. Neurological: Negative. Hematological: Negative. Psychiatric/Behavioral: Negative. Past Medical History:   Diagnosis Date    Atrial fibrillation ([facility] [address]     EKG 10/18/18- sinus bradycardia; followed by Dr Nkechi Weiner ([facility]); wore holter monitor 9/2018 for palpitations    Benign essential HTN 5/11/2016    Bradycardia     rate usually 47-52    Chest pain 05/11/2016    no current symptoms 12/5/18    CKD (chronic kidney disease), stage IV ([facility])     creatinine result 1.6 (11/30/18); followed by PCP    Dizziness 7/1/2016    Edema 05/11/2016    hx of- pt reports no edema currently (12/5/18)    HLD (hyperlipidemia)     Hyponatremia     last hospitalization 2/2018- granddaughter reports r/t medicine at time; PCP reports \"relatively stable\" bet 127-129    LBBB (left bundle branch block)     Palpitations 5/11/2016    Rectocele     Uterovaginal prolapse         Past Surgical History:   Procedure Laterality Date    CYST REMOVAL  1962    left breast    GYN  12/18/2018    [place] colpocleisis, midurethral sling and cystoscopy- Dr. Doreen Lin        No family history on file.         Social Connections:     Frequency of Communication with Friends and Family: Not on file    Frequency of Social Gatherings with Friends and Family: Not on file    Attends Mormonism Services: Not on file    Active Member of Clubs or Organizations: Not on file    Attends Club or Organization Meetings: Not on file    Marital Status: Not on file        Allergies   Allergen Reactions    Cephalexin Other (See Comments)    Codeine Other (See Comments)    Lisinopril Other (See Comments)    Sulfa Antibiotics Other (See Comments)        Vitals signs and nursing note reviewed. No data found. Physical Exam  Vitals and nursing note reviewed. Constitutional:       Appearance: Normal appearance. HENT:      Head: Normocephalic and atraumatic. Right Ear: Tympanic membrane normal.      Left Ear: Tympanic membrane normal.      Nose: Nose normal.      Mouth/Throat:      Mouth: Mucous membranes are moist.   Eyes:      Extraocular Movements: Extraocular movements intact. Pupils: Pupils are equal, round, and reactive to light. Cardiovascular:      Rate and Rhythm: Normal rate and regular rhythm. Pulses: Normal pulses. Heart sounds: Normal heart sounds. Pulmonary:      Effort: Pulmonary effort is normal.      Breath sounds: Normal breath sounds. Abdominal:      General: Abdomen is flat. Palpations: Abdomen is soft. Tenderness: There is no abdominal tenderness. Musculoskeletal:      Cervical back: Normal range of motion and neck supple. Right lower leg: No edema. Left lower leg: No edema. Skin:     General: Skin is warm and dry. Neurological:      General: No focal deficit present. Mental Status: She is alert and oriented to person, place, and time.    Psychiatric:         Mood and Affect: Mood normal.          EKG 12 Lead    Date/Time: 6/7/2022 7:02 PM  Performed by: Lorin Alvarado MD  Authorized by: Lorin Alvarado MD     ECG reviewed by ED Physician in the absence of a cardiologist: yes    Interpretation:     Interpretation: abnormal    Rate:     ECG rate:  42    ECG rate assessment: bradycardic    Rhythm:     Rhythm: sinus rhythm and A-V block    Ectopy:     Ectopy: none    ST segments:     ST segments:  Non-specific        Labs Reviewed   TROPONIN - Abnormal; Notable for the following components:       Result Value    Troponin, High Sensitivity 24.0 (*)     All other components within normal limits   TROPONIN - Abnormal; Notable for the following components:    Troponin, High Sensitivity 38.9 (*)     All other components within normal limits   CBC - Abnormal; Notable for the following components:    RBC 3.58 (*)     Hemoglobin 11.1 (*)     Hematocrit 33.6 (*)     All other components within normal limits   COMPREHENSIVE METABOLIC PANEL - Abnormal; Notable for the following components:    Sodium 129 (*)     Chloride 96 (*)     Glucose 104 (*)     BUN 31 (*)     CREATININE 2.40 (*)     GFR  24 (*)     GFR Non-African American 20 (*)     Albumin/Globulin Ratio 1.1 (*)     All other components within normal limits   MAGNESIUM - Abnormal; Notable for the following components:    Magnesium 2.9 (*)     All other components within normal limits   LIPASE        XR CHEST (2 VW)   Final Result   STABLE CARDIOMEGALY WITH NO ACUTE CARDIOPULMONARY DISEASE   IDENTIFIED. Voice dictation software was used during the making of this note. This software is not perfect and grammatical and other typographical errors may be present. This note has not been completely proofread for errors.      Rosio Ma MD  06/07/22 0878

## 2022-06-07 NOTE — ED NOTES
Report given to [facility], transfer of care at this time.       Camryn Nix, PANKAJ  06/07/22 1931

## 2022-06-07 NOTE — ED TRIAGE NOTES
Pt arrives ambulatory to triage. States intermittent chest pain since yesterday. Substernal. Denies nvd. Denies aggravating or alleviating factors. NAD. Masked.

## 2022-06-07 NOTE — TELEPHONE ENCOUNTER
Furman Schlatter, pt's EC, reports pt complained of heaviness in her chest this morning, weakness and fatigue. Asking for pt to be seen. Furman Schlatter is not with pt and pt cannot hear over the phone. Furman Schlatter will call back.

## 2022-06-08 ENCOUNTER — APPOINTMENT (OUTPATIENT)
Dept: NON INVASIVE DIAGNOSTICS | Age: 87
End: 2022-06-08
Payer: MEDICARE

## 2022-06-08 VITALS
RESPIRATION RATE: 20 BRPM | SYSTOLIC BLOOD PRESSURE: 146 MMHG | BODY MASS INDEX: 22.09 KG/M2 | HEART RATE: 59 BPM | HEIGHT: 61 IN | WEIGHT: 117 LBS | DIASTOLIC BLOOD PRESSURE: 52 MMHG | OXYGEN SATURATION: 98 % | TEMPERATURE: 98.3 F

## 2022-06-08 PROBLEM — I20.0 UNSTABLE ANGINA (HCC): Status: RESOLVED | Noted: 2022-06-07 | Resolved: 2022-06-08

## 2022-06-08 PROBLEM — R79.89 ELEVATED TROPONIN: Status: RESOLVED | Noted: 2022-06-07 | Resolved: 2022-06-08

## 2022-06-08 PROBLEM — R77.8 ELEVATED TROPONIN: Status: RESOLVED | Noted: 2022-06-07 | Resolved: 2022-06-08

## 2022-06-08 PROBLEM — I10 ACCELERATED HYPERTENSION: Status: RESOLVED | Noted: 2018-09-04 | Resolved: 2022-06-08

## 2022-06-08 LAB
ANION GAP SERPL CALC-SCNC: 8 MMOL/L (ref 7–16)
BUN SERPL-MCNC: 29 MG/DL (ref 8–23)
CALCIUM SERPL-MCNC: 8.7 MG/DL (ref 8.3–10.4)
CHLORIDE SERPL-SCNC: 102 MMOL/L (ref 98–107)
CHOLEST SERPL-MCNC: 187 MG/DL
CO2 SERPL-SCNC: 24 MMOL/L (ref 21–32)
CREAT SERPL-MCNC: 2 MG/DL (ref 0.6–1)
ECHO AO ASC DIAM: 2.9 CM
ECHO AO ASCENDING AORTA INDEX: 1.93 CM/M2
ECHO AO ROOT DIAM: 2.9 CM
ECHO AO ROOT INDEX: 1.93 CM/M2
ECHO AV AREA PEAK VELOCITY: 2.9 CM2
ECHO AV AREA VTI: 2.8 CM2
ECHO AV AREA/BSA PEAK VELOCITY: 1.9 CM2/M2
ECHO AV AREA/BSA VTI: 1.9 CM2/M2
ECHO AV MEAN GRADIENT: 9 MMHG
ECHO AV MEAN VELOCITY: 1.4 M/S
ECHO AV PEAK GRADIENT: 14 MMHG
ECHO AV PEAK VELOCITY: 1.9 M/S
ECHO AV VELOCITY RATIO: 1
ECHO AV VTI: 61.4 CM
ECHO BSA: 1.52 M2
ECHO EST RA PRESSURE: 3 MMHG
ECHO IVC PROX: 1.7 CM
ECHO LA AREA 2C: 28.6 CM2
ECHO LA AREA 4C: 27.1 CM2
ECHO LA DIAMETER INDEX: 2.53 CM/M2
ECHO LA DIAMETER: 3.8 CM
ECHO LA MAJOR AXIS: 6.7 CM
ECHO LA MINOR AXIS: 7.4 CM
ECHO LA TO AORTIC ROOT RATIO: 1.31
ECHO LA VOL BP: 94 ML (ref 22–52)
ECHO LA VOL/BSA BIPLANE: 63 ML/M2 (ref 16–34)
ECHO LV E' LATERAL VELOCITY: 7 CM/S
ECHO LV E' SEPTAL VELOCITY: 5 CM/S
ECHO LV EDV A2C: 108 ML
ECHO LV EDV A4C: 135 ML
ECHO LV EDV INDEX A4C: 90 ML/M2
ECHO LV EDV NDEX A2C: 72 ML/M2
ECHO LV EJECTION FRACTION A2C: 72 %
ECHO LV EJECTION FRACTION A4C: 70 %
ECHO LV EJECTION FRACTION BIPLANE: 71 % (ref 55–100)
ECHO LV ESV A2C: 31 ML
ECHO LV ESV A4C: 40 ML
ECHO LV ESV INDEX A2C: 21 ML/M2
ECHO LV ESV INDEX A4C: 27 ML/M2
ECHO LV FRACTIONAL SHORTENING: 37 % (ref 28–44)
ECHO LV INTERNAL DIMENSION DIASTOLE INDEX: 2.87 CM/M2
ECHO LV INTERNAL DIMENSION DIASTOLIC: 4.3 CM (ref 3.9–5.3)
ECHO LV INTERNAL DIMENSION SYSTOLIC INDEX: 1.8 CM/M2
ECHO LV INTERNAL DIMENSION SYSTOLIC: 2.7 CM
ECHO LV IVSD: 1 CM (ref 0.6–0.9)
ECHO LV MASS 2D: 114.2 G (ref 67–162)
ECHO LV MASS INDEX 2D: 76.1 G/M2 (ref 43–95)
ECHO LV POSTERIOR WALL DIASTOLIC: 0.7 CM (ref 0.6–0.9)
ECHO LV RELATIVE WALL THICKNESS RATIO: 0.33
ECHO LVOT AREA: 2.8 CM2
ECHO LVOT AV VTI INDEX: 0.97
ECHO LVOT DIAM: 1.9 CM
ECHO LVOT MEAN GRADIENT: 9 MMHG
ECHO LVOT PEAK GRADIENT: 14 MMHG
ECHO LVOT PEAK VELOCITY: 1.9 M/S
ECHO LVOT STROKE VOLUME INDEX: 112.4 ML/M2
ECHO LVOT SV: 168.6 ML
ECHO LVOT VTI: 59.5 CM
ECHO MV A VELOCITY: 1.31 M/S
ECHO MV E DECELERATION TIME (DT): 345 MS
ECHO MV E VELOCITY: 1.24 M/S
ECHO MV E/A RATIO: 0.95
ECHO MV E/E' LATERAL: 17.71
ECHO MV E/E' RATIO (AVERAGED): 21.26
ECHO MV E/E' SEPTAL: 24.8
ECHO PULMONARY ARTERY END DIASTOLIC PRESSURE: 6 MMHG
ECHO PV ACCELERATION TIME (AT): 158 MS
ECHO PV MAX VELOCITY: 1.2 M/S
ECHO PV PEAK GRADIENT: 6 MMHG
ECHO PV REGURGITANT MAX VELOCITY: 1.3 M/S
ECHO RIGHT VENTRICULAR SYSTOLIC PRESSURE (RVSP): 27 MMHG
ECHO RV BASAL DIMENSION: 3.7 CM
ECHO RV INTERNAL DIMENSION: 3.5 CM
ECHO RV TAPSE: 2.5 CM (ref 1.7–?)
ECHO TV REGURGITANT MAX VELOCITY: 2.47 M/S
ECHO TV REGURGITANT PEAK GRADIENT: 24 MMHG
EKG ATRIAL RATE: 42 BPM
EKG DIAGNOSIS: NORMAL
EKG P AXIS: 52 DEGREES
EKG P-R INTERVAL: 252 MS
EKG Q-T INTERVAL: 532 MS
EKG QRS DURATION: 94 MS
EKG QTC CALCULATION (BAZETT): 444 MS
EKG R AXIS: 8 DEGREES
EKG T AXIS: 65 DEGREES
EKG VENTRICULAR RATE: 42 BPM
ERYTHROCYTE [DISTWIDTH] IN BLOOD BY AUTOMATED COUNT: 13.5 % (ref 11.9–14.6)
GLUCOSE SERPL-MCNC: 89 MG/DL (ref 65–100)
HCT VFR BLD AUTO: 29.7 % (ref 35.8–46.3)
HDLC SERPL-MCNC: 57 MG/DL (ref 40–60)
HDLC SERPL: 3.3 {RATIO}
HGB BLD-MCNC: 9.8 G/DL (ref 11.7–15.4)
LDLC SERPL CALC-MCNC: 114.6 MG/DL
MAGNESIUM SERPL-MCNC: 2.9 MG/DL (ref 1.8–2.4)
MCH RBC QN AUTO: 30.3 PG (ref 26.1–32.9)
MCHC RBC AUTO-ENTMCNC: 33 G/DL (ref 31.4–35)
MCV RBC AUTO: 92 FL (ref 79.6–97.8)
NRBC # BLD: 0 K/UL (ref 0–0.2)
PLATELET # BLD AUTO: 288 K/UL (ref 150–450)
PMV BLD AUTO: 9.4 FL (ref 9.4–12.3)
POTASSIUM SERPL-SCNC: 4.9 MMOL/L (ref 3.5–5.1)
RBC # BLD AUTO: 3.23 M/UL (ref 4.05–5.2)
SODIUM SERPL-SCNC: 134 MMOL/L (ref 136–145)
TRIGL SERPL-MCNC: 77 MG/DL (ref 35–150)
TROPONIN I SERPL HS-MCNC: 40.8 PG/ML (ref 0–14)
UFH PPP CHRO-ACNC: 0.29 IU/ML (ref 0.3–0.7)
VLDLC SERPL CALC-MCNC: 15.4 MG/DL (ref 6–23)
WBC # BLD AUTO: 6.4 K/UL (ref 4.3–11.1)

## 2022-06-08 PROCEDURE — 2580000003 HC RX 258: Performed by: EMERGENCY MEDICINE

## 2022-06-08 PROCEDURE — 80061 LIPID PANEL: CPT

## 2022-06-08 PROCEDURE — 96366 THER/PROPH/DIAG IV INF ADDON: CPT

## 2022-06-08 PROCEDURE — 6360000002 HC RX W HCPCS: Performed by: INTERNAL MEDICINE

## 2022-06-08 PROCEDURE — 84484 ASSAY OF TROPONIN QUANT: CPT

## 2022-06-08 PROCEDURE — 85027 COMPLETE CBC AUTOMATED: CPT

## 2022-06-08 PROCEDURE — G0378 HOSPITAL OBSERVATION PER HR: HCPCS

## 2022-06-08 PROCEDURE — 2580000003 HC RX 258: Performed by: INTERNAL MEDICINE

## 2022-06-08 PROCEDURE — 6370000000 HC RX 637 (ALT 250 FOR IP): Performed by: INTERNAL MEDICINE

## 2022-06-08 PROCEDURE — 93306 TTE W/DOPPLER COMPLETE: CPT | Performed by: INTERNAL MEDICINE

## 2022-06-08 PROCEDURE — 83735 ASSAY OF MAGNESIUM: CPT

## 2022-06-08 PROCEDURE — 85520 HEPARIN ASSAY: CPT

## 2022-06-08 PROCEDURE — C8929 TTE W OR WO FOL WCON,DOPPLER: HCPCS

## 2022-06-08 PROCEDURE — 6360000004 HC RX CONTRAST MEDICATION: Performed by: INTERNAL MEDICINE

## 2022-06-08 PROCEDURE — 80048 BASIC METABOLIC PNL TOTAL CA: CPT

## 2022-06-08 PROCEDURE — 36415 COLL VENOUS BLD VENIPUNCTURE: CPT

## 2022-06-08 PROCEDURE — 99217 PR OBSERVATION CARE DISCHARGE MANAGEMENT: CPT | Performed by: INTERNAL MEDICINE

## 2022-06-08 RX ORDER — ISOSORBIDE MONONITRATE 30 MG/1
30 TABLET, EXTENDED RELEASE ORAL DAILY
Qty: 30 TABLET | Refills: 3 | Status: SHIPPED | OUTPATIENT
Start: 2022-06-09 | End: 2022-06-27 | Stop reason: SDUPTHER

## 2022-06-08 RX ORDER — FAMOTIDINE 20 MG/1
20 TABLET, FILM COATED ORAL DAILY
Status: DISCONTINUED | OUTPATIENT
Start: 2022-06-09 | End: 2022-06-08 | Stop reason: HOSPADM

## 2022-06-08 RX ORDER — NITROGLYCERIN 0.4 MG/1
TABLET SUBLINGUAL
Qty: 25 TABLET | Refills: 3 | Status: SHIPPED | OUTPATIENT
Start: 2022-06-08

## 2022-06-08 RX ADMIN — SODIUM CHLORIDE, PRESERVATIVE FREE 3 ML: 5 INJECTION INTRAVENOUS at 05:28

## 2022-06-08 RX ADMIN — HEPARIN SODIUM 1650 UNITS: 1000 INJECTION INTRAVENOUS; SUBCUTANEOUS at 05:25

## 2022-06-08 RX ADMIN — PERFLUTREN 1.65 MG: 6.52 INJECTION, SUSPENSION INTRAVENOUS at 10:09

## 2022-06-08 RX ADMIN — AMIODARONE HYDROCHLORIDE 100 MG: 200 TABLET ORAL at 08:57

## 2022-06-08 RX ADMIN — AMLODIPINE BESYLATE 10 MG: 10 TABLET ORAL at 08:57

## 2022-06-08 RX ADMIN — SPIRONOLACTONE 12.5 MG: 25 TABLET ORAL at 08:57

## 2022-06-08 RX ADMIN — FAMOTIDINE 20 MG: 20 TABLET, FILM COATED ORAL at 08:57

## 2022-06-08 RX ADMIN — PANTOPRAZOLE SODIUM 40 MG: 40 TABLET, DELAYED RELEASE ORAL at 05:27

## 2022-06-08 RX ADMIN — EZETIMIBE 10 MG: 10 TABLET ORAL at 08:57

## 2022-06-08 RX ADMIN — HYDRALAZINE HYDROCHLORIDE 50 MG: 25 TABLET, FILM COATED ORAL at 08:55

## 2022-06-08 RX ADMIN — ASPIRIN 81 MG: 81 TABLET, CHEWABLE ORAL at 08:55

## 2022-06-08 RX ADMIN — ISOSORBIDE MONONITRATE 30 MG: 30 TABLET, EXTENDED RELEASE ORAL at 08:57

## 2022-06-08 RX ADMIN — SODIUM CHLORIDE, PRESERVATIVE FREE 10 ML: 5 INJECTION INTRAVENOUS at 09:01

## 2022-06-08 ASSESSMENT — PAIN SCALES - GENERAL
PAINLEVEL_OUTOF10: 0

## 2022-06-08 NOTE — ED NOTES
TRANSFER - OUT REPORT:    Verbal report given to [place] Delvis on Danita Des  being transferred to [facility] for routine progression of patient care       Report consisted of patient's Situation, Background, Assessment and   Recommendations(SBAR). Information from the following report(s) Nurse Handoff Report was reviewed with the receiving nurse. Lines:   Peripheral IV 06/07/22 Right Forearm (Active)   Site Assessment Clean, dry & intact 06/07/22 2048   Line Status Blood return noted 06/07/22 2048   Phlebitis Assessment No symptoms 06/07/22 2048   Infiltration Assessment 0 06/07/22 2048   Dressing Status Clean, dry & intact 06/07/22 2048        Opportunity for questions and clarification was provided.       Patient transported with:  Registered Nurse     Gabino Lord RN  06/07/22 2049

## 2022-06-08 NOTE — PLAN OF CARE
Problem: Discharge Planning  Goal: Discharge to home or other facility with appropriate resources  Outcome: Progressing  Flowsheets (Taken 6/7/2022 2217)  Discharge to home or other facility with appropriate resources:   Identify barriers to discharge with patient and caregiver   Identify discharge learning needs (meds, wound care, etc)   Refer to discharge planning if patient needs post-hospital services based on physician order or complex needs related to functional status, cognitive ability or social support system   Arrange for needed discharge resources and transportation as appropriate   Arrange for interpreters to assist at discharge as needed     Problem: Safety - Adult  Goal: Free from fall injury  Outcome: Progressing

## 2022-06-08 NOTE — CARE COORDINATION
Pt presented to the ED c/o accelerating HTN. She was also noted to have atypical CP as well. Pts HTN was treated with CP resolving. She was offered a LH but currently refused invasive diagnostic testing at this time for more conservative management. Pt is discharging home now in stable condition. No discharge needs were identified. Tx goals have been met. 06/08/22 1315   Service Assessment   Patient Orientation Alert and Oriented   Cognition Alert   History Provided By Patient   Primary [address] Children;Family Members;[employer]/[place];Friends/Neighbors   PCP Verified by CM Yes  Luretha Esters)   Prior Functional Level Independent in ADLs/IADLs   Current Functional Level Independent in ADLs/IADLs   Can patient return to prior living arrangement Yes   Ability to make needs known: Good   Family able to assist with home care needs: Yes   Financial Resources Medicare   Social/Functional History   Mode of Transportation Car   Occupation Retired   Discharge Planning   Patient expects to be discharged to: [address] Discharge   [address] Discharge None   [address] Provided? No   Mode of Transport at Discharge Other (see comment)  (Family)   Confirm Follow Up Transport Family   Condition of Participation: Discharge Planning   The Patient and/or Patient Representative was provided with a Choice of Provider? Patient   The Patient and/Or Patient Representative agree with the Discharge Plan? Yes   Freedom of Choice list was provided with basic dialogue that supports the patient's individualized plan of care/goals, treatment preferences, and shares the quality data associated with the providers?   Yes

## 2022-06-08 NOTE — PLAN OF CARE
Problem: Discharge Planning  Goal: Discharge to home or other facility with appropriate resources  6/8/2022 1257 by Shiela Kingsley RN  Outcome: Completed  6/8/2022 0515 by Bulmaro Garzon RN  Outcome: Progressing  Flowsheets (Taken 6/7/2022 2217)  Discharge to home or other facility with appropriate resources:   Identify barriers to discharge with patient and caregiver   Identify discharge learning needs (meds, wound care, etc)   Refer to discharge planning if patient needs post-hospital services based on physician order or complex needs related to functional status, cognitive ability or social support system   Arrange for needed discharge resources and transportation as appropriate   Arrange for interpreters to assist at discharge as needed     Problem: Safety - Adult  Goal: Free from fall injury  6/8/2022 1257 by Shiela Kingsley RN  Outcome: Completed  6/8/2022 0515 by Bulmaro Garzon, RN  Outcome: Progressing     Problem: ABCDS Injury Assessment  Goal: Absence of physical injury  Outcome: Completed

## 2022-06-08 NOTE — PROGRESS NOTES
6/8/2022 9:40 AM    Admit Date: 6/7/2022    Admit Diagnosis: Precordial pain [R07.2]  Unstable angina ([address] [I20.0]  Elevated troponin [R77.8]      Subjective:   No further chest pain or shortness of breath      Objective:    BP (!) 130/55   Pulse 54   Temp 98.1 °F (36.7 °C) (Oral)   Resp 18   Ht 5' 1\" (1.549 m)   Wt 117 lb (53.1 kg)   SpO2 98%   BMI 22.11 kg/m²     Physical Exam:  Johnny Chen, Well Nourished, No Acute Distress, Alert & Oriented x 3, appropriate mood. Neck- supple, no JVD  CV- regular rate and rhythm no MRG  Lung- clear bilaterally  Abd- soft, nontender, nondistended  Ext- no edema bilaterally. Skin- warm and dry        Data Review:   Recent Labs     06/07/22  1432 06/07/22  2221 06/08/22  0359   NA   < >  --  134*   K   < >  --  4.9   BUN   < >  --  29*   WBC   < >  --  6.4   HGB   < >  --  9.8*   HCT   < >  --  29.7*   PLT   < >  --  288   INR  --  1.1  --    HDL  --   --  57    < > = values in this interval not displayed. Assessment/Plan:     Active Hospital Problems    Cp-troponin levels are flat. Chest pain in setting of marked hypertension. Patient desires conservative therapy and long-acting nitrates have been added.   Will discharge patient home after ambulation if she continues to be chest pain-free with close outpatient follow-up      Sinus bradycardia      Unstable angina (HCC)      HLD (hyperlipidemia)      Accelerated hypertension      Precordial pain      Edema      Palpitations

## 2022-06-08 NOTE — PROGRESS NOTES
TRANSFER - IN REPORT:    Verbal report received from Betito Aldrich RN on Mini Ellington being received from ER for routine progression of care. Report consisted of patients Situation, Background, Assessment and Recommendations(SBAR). Information from the following report(s) Kardex, ED Summary and Cardiac Rhythm Sinus Anna Cassette was reviewed. Opportunity for questions and clarification was provided. Assessment completed upon patients arrival to unit and care assumed. Patient received to room 302. Patient connected to monitor and assessment completed. Plan of care reviewed. Patient oriented to room and call light. Patient aware to use call light to communicate any chest pain or needs. Admission skin assessment completed with second RN and reveals the following:     Pt sacrum and heels free of redness and intact. Pt has some small scattered scars. Pt also has 1-2+ non-pitting edema on both ankles.    Cosign: PANKAJ Marrufo

## 2022-06-08 NOTE — DISCHARGE SUMMARY
[address] [facility] 121 Cardiology Discharge Summary     Patient ID:  Kings Snow  [id number]  8 y.o.  8/17/1928    Admit date: 6/7/2022    Discharge date and time:  06/08/22    Admitting Physician: Shanel Han MD     Discharge Physician: ELIZABETH Black - CNP/    Admission Diagnoses: Precordial pain [R07.2]  Unstable angina ([address] [I20.0]  Elevated troponin [R77.8]    Discharge Diagnoses:    Diagnosis    Elevated troponin    Sinus bradycardia    Unstable angina ([address]    Uterovaginal prolapse, complete    HLD (hyperlipidemia)    Accelerated hypertension    Dizziness    Palpitations    Precordial pain    Edema       Cardiology Procedures this admission: Echo    Consults: None    Hospital Course: The patient came into the ED with complaints of accelerating HTN with mildly elevated HS Trop at 24.0/38.9/40.8. The patient was also noted to have atypical CP as well. The patient HTN was treated with CP resolving. The patient was offered a [facility] but currently refused invasive diagnostic testing at this time for more conservative management. The patient was placed on long acting nitrates and will be followed closely in the outpatient setting. The patient had no other complaints at the time of discharge and walked to [place] chest pain free. The patients echo was as follows:     06/07/22    TRANSTHORACIC ECHOCARDIOGRAM (TTE) COMPLETE (CONTRAST/BUBBLE/3D PRN) 06/08/2022 11:46 AM (Final)    Interpretation Summary    Left Ventricle: Normal left ventricular systolic function with a visually estimated EF of 60 - 65%. Left ventricle size is normal. Normal wall thickness. Normal wall motion. Abnormal diastolic function.   Left Atrium: Left atrium is severely dilated.   Aortic Valve: Valve structure is normal. Moderate sclerosis of the aortic valve cusp. Mild regurgitation. Signed by: Dylon Harris MD on 6/8/2022 11:46 AM      The day of discharge Pt's labs were at the patients baseline.  Pt was seen and examined by Dr. Teo Collins and determined stable and ready for discharge. Pt was instructed to follow discharge instructions given by nursing staff. DISPOSITION: The patient is being discharged home in stable condition on a low saturated fat, low cholesterol and low salt diet. Pt is instructed to advance activities as tolerated limited to fatigue or shortness of breath. Discharge Exam: BP (!) 130/55   Pulse 54   Temp 98.1 °F (36.7 °C) (Oral)   Resp 18   Ht 5' 1\" (1.549 m)   Wt 117 lb (53.1 kg)   SpO2 98%   BMI 22.11 kg/m²  Pt has been seen by Dr Teo Collins: see his progress note for exam details.     Recent Results (from the past 24 hour(s))   EKG 12 Lead    Collection Time: 06/07/22  2:25 PM   Result Value Ref Range    Ventricular Rate 42 BPM    Atrial Rate 42 BPM    P-R Interval 252 ms    QRS Duration 94 ms    Q-T Interval 532 ms    QTc Calculation (Bazett) 444 ms    P Axis 52 degrees    R Axis 8 degrees    T Axis 65 degrees    Diagnosis Marked sinus bradycardia with 1st degree A-V block    Troponin    Collection Time: 06/07/22  2:32 PM   Result Value Ref Range    Troponin, High Sensitivity 24.0 (H) 0 - 14 pg/mL   CBC    Collection Time: 06/07/22  2:32 PM   Result Value Ref Range    WBC 7.3 4.3 - 11.1 K/uL    RBC 3.58 (L) 4.05 - 5.2 M/uL    Hemoglobin 11.1 (L) 11.7 - 15.4 g/dL    Hematocrit 33.6 (L) 35.8 - 46.3 %    MCV 93.9 79.6 - 97.8 FL    MCH 31.0 26.1 - 32.9 PG    MCHC 33.0 31.4 - 35.0 g/dL    RDW 13.5 11.9 - 14.6 %    Platelets 510 830 - 297 K/uL    MPV 9.4 9.4 - 12.3 FL    nRBC 0.00 0.0 - 0.2 K/uL   Comprehensive Metabolic Panel    Collection Time: 06/07/22  2:32 PM   Result Value Ref Range    Sodium 129 (L) 136 - 145 mmol/L    Potassium 4.9 3.5 - 5.1 mmol/L    Chloride 96 (L) 98 - 107 mmol/L    CO2 26 21 - 32 mmol/L    Anion Gap 7 7 - 16 mmol/L    Glucose 104 (H) 65 - 100 mg/dL    BUN 31 (H) 8 - 23 MG/DL    CREATININE 2.40 (H) 0.6 - 1.0 MG/DL    GFR  24 (L) >60 ml/min/1.73m2    GFR Non-African American 20 (L) >60 ml/min/1.73m2    Calcium 8.8 8.3 - 10.4 MG/DL    Total Bilirubin 0.4 0.2 - 1.1 MG/DL    ALT 25 12 - 65 U/L    AST 20 15 - 37 U/L    Alk Phosphatase 84 50 - 136 U/L    Total Protein 7.2 6.3 - 8.2 g/dL    Albumin 3.8 3.2 - 4.6 g/dL    Globulin 3.4 2.3 - 3.5 g/dL    Albumin/Globulin Ratio 1.1 (L) 1.2 - 3.5     Lipase    Collection Time: 06/07/22  2:32 PM   Result Value Ref Range    Lipase 390 73 - 393 U/L   Magnesium    Collection Time: 06/07/22  2:32 PM   Result Value Ref Range    Magnesium 2.9 (H) 1.8 - 2.4 mg/dL   Troponin    Collection Time: 06/07/22  5:51 PM   Result Value Ref Range    Troponin, High Sensitivity 38.9 (H) 0 - 14 pg/mL   APTT    Collection Time: 06/07/22 10:21 PM   Result Value Ref Range    .1 (H) 24.1 - 35.1 SEC   Protime-INR    Collection Time: 06/07/22 10:21 PM   Result Value Ref Range    Protime 14.2 12.6 - 14.5 sec    INR 1.1     CBC    Collection Time: 06/08/22  3:59 AM   Result Value Ref Range    WBC 6.4 4.3 - 11.1 K/uL    RBC 3.23 (L) 4.05 - 5.2 M/uL    Hemoglobin 9.8 (L) 11.7 - 15.4 g/dL    Hematocrit 29.7 (L) 35.8 - 46.3 %    MCV 92.0 79.6 - 97.8 FL    MCH 30.3 26.1 - 32.9 PG    MCHC 33.0 31.4 - 35.0 g/dL    RDW 13.5 11.9 - 14.6 %    Platelets 323 784 - 087 K/uL    MPV 9.4 9.4 - 12.3 FL    nRBC 0.00 0.0 - 0.2 K/uL   Basic Metabolic Panel w/ Reflex to MG    Collection Time: 06/08/22  3:59 AM   Result Value Ref Range    Sodium 134 (L) 136 - 145 mmol/L    Potassium 4.9 3.5 - 5.1 mmol/L    Chloride 102 98 - 107 mmol/L    CO2 24 21 - 32 mmol/L    Anion Gap 8 7 - 16 mmol/L    Glucose 89 65 - 100 mg/dL    BUN 29 (H) 8 - 23 MG/DL    CREATININE 2.00 (H) 0.6 - 1.0 MG/DL    GFR African American 30 (L) >60 ml/min/1.73m2    GFR Non- 25 (L) >60 ml/min/1.73m2    Calcium 8.7 8.3 - 10.4 MG/DL   Magnesium    Collection Time: 06/08/22  3:59 AM   Result Value Ref Range    Magnesium 2.9 (H) 1.8 - 2.4 mg/dL   Lipid Panel    Collection Time: 06/08/22  3:59 AM   Result Value Ref Range    Cholesterol, Total 187 <200 MG/DL    Triglycerides 77 35 - 150 MG/DL    HDL 57 40 - 60 MG/DL    LDL Calculated 114.6 (H) <100 MG/DL    VLDL Cholesterol Calculated 15.4 6.0 - 23.0 MG/DL    Chol/HDL Ratio 3.3     Anti-Xa, Unfractionated Heparin    Collection Time: 06/08/22  3:59 AM   Result Value Ref Range    Anti-XA Unfrac Heparin 0.29 (L) 0.3 - 0.7 IU/mL   Troponin    Collection Time: 06/08/22  5:59 AM   Result Value Ref Range    Troponin, High Sensitivity 40.8 (H) 0 - 14 pg/mL   Transthoracic echocardiogram (TTE) complete with contrast, bubble, strain, and 3D PRN    Collection Time: 06/08/22 10:09 AM   Result Value Ref Range    LV EDV A2C 108 mL    LV EDV A4C 135 mL    LV ESV A2C 31 mL    LV ESV A4C 40 mL    IVSd 1.0 (A) 0.6 - 0.9 cm    LVIDd 4.3 3.9 - 5.3 cm    LVIDs 2.7 cm    LVOT Diameter 1.9 cm    LVOT Mean Gradient 9 mmHg    LVOT VTI 59.5 cm    LVOT Peak Velocity 1.9 m/s    LVOT Peak Gradient 14 mmHg    LVPWd 0.7 0.6 - 0.9 cm    LV E' Lateral Velocity 7 cm/s    LV E' Septal Velocity 5 cm/s    LV Ejection Fraction A2C 72 %    LV Ejection Fraction A4C 70 %    EF BP 71 55 - 100 %    LVOT Area 2.8 cm2    LVOT .6 ml    LA Minor Axis 7.4 cm    LA Major [place] 6.7 cm    LA Area 2C 28.6 cm2    LA Area 4C 27.1 cm2    LA Volume BP 94 (A) 22 - 52 mL    LA Diameter 3.8 cm    AV Mean Velocity 1.4 m/s    AV Mean Gradient 9 mmHg    AV VTI 61.4 cm    AV Peak Velocity 1.9 m/s    AV Peak Gradient 14 mmHg    AV Area by VTI 2.8 cm2    AV Area by Peak Velocity 2.9 cm2    Aortic Root 2.9 cm    Ascending Aorta 2.9 cm    IVC Proxmal 1.7 cm    MV E Wave Deceleration Time 345.0 ms    MV A Velocity 1.31 m/s    MV E Velocity 1.24 m/s    NC Max Velocity 1.3 m/s    Pulmonary Artery EDP 6 mmHg    PV .0 ms    PV Max Velocity 1.2 m/s    PV Peak Gradient 6 mmHg    RVIDd 3.5 cm    RV Basal Dimension 3.7 cm    TAPSE 2.5 1.7 cm    TR Max Velocity 2.47 m/s    TR Peak Gradient 24 mmHg Body Surface Area 1.52 m2    Fractional Shortening 2D 37 28 - 44 %    LV ESV Index A4C 27 mL/m2    LV EDV Index A4C 90 mL/m2    LV ESV Index A2C 21 mL/m2    LV EDV Index A2C 72 mL/m2    LVIDd Index 2.87 cm/m2    LVIDs Index 1.80 cm/m2    LV RWT Ratio 0.33     LV Mass 2D 114.2 67 - 162 g    LV Mass 2D Index 76.1 43 - 95 g/m2    MV E/A 0.95     E/E' Ratio (Averaged) 21.26     E/E' Lateral 17.71     E/E' Septal 24.80     LA Volume Index BP 63 (A) 16 - 34 ml/m2    LVOT Stroke Volume Index 112.4 mL/m2    LA Size Index 2.53 cm/m2    LA/AO Root Ratio 1.31     Ao Root Index 1.93 cm/m2    Ascending Aorta Index 1.93 cm/m2    AV Velocity Ratio 1.00     LVOT:AV VTI Index 0.97     SILVANO/BSA VTI 1.9 cm2/m2    SILVANO/BSA Peak Velocity 1.9 cm2/m2    Est. RA Pressure 3 mmHg    RVSP 27 mmHg         Patient Instructions:     Current Discharge Medication List      START taking these medications    Details   isosorbide mononitrate (IMDUR) 30 MG extended release tablet Take 1 tablet by mouth daily  Qty: 30 tablet, Refills: 3      nitroGLYCERIN (NITROSTAT) 0.4 MG SL tablet up to max of 3 total doses. If no relief after 1 dose, call 911.   Qty: 25 tablet, Refills: 3         CONTINUE these medications which have NOT CHANGED    Details   amiodarone (CORDARONE) 200 MG tablet Take 100 mg by mouth daily      amLODIPine (NORVASC) 10 MG tablet Take 10 mg by mouth daily      aspirin 81 MG EC tablet Take by mouth daily      Calcium Carb-Cholecalciferol (OYSTER SHELL CALCIUM) 500-400 MG-UNIT TABS Take by mouth      cimetidine (TAGAMET) 200 MG tablet Take 400 mg by mouth 2 times daily      diclofenac sodium (VOLTAREN) 1 % GEL Apply topically 3 times daily as needed      ezetimibe (ZETIA) 10 MG tablet Take 10 mg by mouth daily      furosemide (LASIX) 40 MG tablet Take 20 mg by mouth daily      hydrALAZINE (APRESOLINE) 50 MG tablet TAKE 1 TABLET BY MOUTH TWICE A DAY      latanoprost (XALATAN) 0.005 % ophthalmic solution Apply 1 drop to eye      magnesium oxide (MAG-OX) 400 (240 Mg) MG tablet Take 400 mg by mouth 2 times daily      metoprolol tartrate (LOPRESSOR) 25 MG tablet Take 25 mg by mouth 2 times daily      omeprazole (PRILOSEC OTC) 20 MG tablet Take 20 mg by mouth daily      polyethylene glycol (GLYCOLAX) 17 GM/SCOOP powder Take 17 g by mouth daily      spironolactone (ALDACTONE) 25 MG tablet TAKE 1/2 TABLET BY MOUTH DAILY      vitamin E 400 UNIT capsule Take 400 Units by mouth daily                 Signed:  ELIZABETH Bowie CNP  6/8/2022  12:43 PM

## 2022-06-09 ENCOUNTER — PATIENT MESSAGE (OUTPATIENT)
Dept: CARDIOLOGY CLINIC | Age: 87
End: 2022-06-09

## 2022-06-09 NOTE — TELEPHONE ENCOUNTER
From: Lasha Blankenship  To: Dr. Jamie Aragon: 6/9/2022 4:46 PM EDT  Subject: Hospital stay follow up    Hi Dr Alexsander Cole, Do you want us to schedule a follow up visit after my hospital stay on 6/7?

## 2022-06-10 ENCOUNTER — PATIENT MESSAGE (OUTPATIENT)
Dept: CARDIOLOGY CLINIC | Age: 87
End: 2022-06-10

## 2022-06-10 NOTE — TELEPHONE ENCOUNTER
From: Katherine Mane  To: Dr. Stephanie Brooks: 6/10/2022 11:34 AM EDT  Subject: Imdur    We started Marcelina Hernandez on the Imdur after picking the prescription up yesterday around 1:30. She had no complaints yesterday. This morning, however, she woke up feeling well, but became weak and nauseous about 30 minutes after taking here a.m. medicine which included the Imdur. He BP and pulse are strong and within her normal range. We are going to move it to lunch time until her body is adjusted unless you have other concerns or suggestions. Thank you.

## 2022-06-10 NOTE — TELEPHONE ENCOUNTER
My Chart note sent asking patient to monitor the weakness and nausea when taking Imdur and to call the office if there is no improvement or if symptoms worsen.

## 2022-06-10 NOTE — TELEPHONE ENCOUNTER
Spoke to pt, her daughter and grand daughter. Made her aware after consulting Dr Daniel Champion she should have a follow up. Scheduled pt for 6/27/22 in [facility] office. Understanding voiced.

## 2022-06-27 ENCOUNTER — OFFICE VISIT (OUTPATIENT)
Dept: CARDIOLOGY CLINIC | Age: 87
End: 2022-06-27
Payer: MEDICARE

## 2022-06-27 VITALS
HEART RATE: 54 BPM | WEIGHT: 123 LBS | HEIGHT: 61 IN | SYSTOLIC BLOOD PRESSURE: 130 MMHG | DIASTOLIC BLOOD PRESSURE: 58 MMHG | BODY MASS INDEX: 23.22 KG/M2

## 2022-06-27 DIAGNOSIS — E78.2 MIXED HYPERLIPIDEMIA: ICD-10-CM

## 2022-06-27 DIAGNOSIS — R42 DIZZINESS: ICD-10-CM

## 2022-06-27 DIAGNOSIS — R00.2 PALPITATIONS: ICD-10-CM

## 2022-06-27 DIAGNOSIS — R00.1 SINUS BRADYCARDIA: Primary | ICD-10-CM

## 2022-06-27 PROCEDURE — 1123F ACP DISCUSS/DSCN MKR DOCD: CPT | Performed by: INTERNAL MEDICINE

## 2022-06-27 PROCEDURE — G8427 DOCREV CUR MEDS BY ELIG CLIN: HCPCS | Performed by: INTERNAL MEDICINE

## 2022-06-27 PROCEDURE — G8420 CALC BMI NORM PARAMETERS: HCPCS | Performed by: INTERNAL MEDICINE

## 2022-06-27 PROCEDURE — 4004F PT TOBACCO SCREEN RCVD TLK: CPT | Performed by: INTERNAL MEDICINE

## 2022-06-27 PROCEDURE — 1090F PRES/ABSN URINE INCON ASSESS: CPT | Performed by: INTERNAL MEDICINE

## 2022-06-27 PROCEDURE — 99214 OFFICE O/P EST MOD 30 MIN: CPT | Performed by: INTERNAL MEDICINE

## 2022-06-27 RX ORDER — ISOSORBIDE MONONITRATE 30 MG/1
30 TABLET, EXTENDED RELEASE ORAL DAILY
Qty: 90 TABLET | Refills: 3 | Status: SHIPPED | OUTPATIENT
Start: 2022-06-27

## 2022-06-27 RX ORDER — DOCUSATE SODIUM 100 MG/1
100 CAPSULE, LIQUID FILLED ORAL EVERY EVENING
COMMUNITY

## 2022-06-27 NOTE — PROGRESS NOTES
[address], [address]  PHONE: [phone]      22    NAME:  James Dougherty  : 1928  MRN: [id number]       SUBJECTIVE:   James Dougherty is a 80 y.o. female seen for a follow up visit regarding the following:     Chief Complaint   Patient presents with    Chest Pain     was seen at [facility] [place] [place] for chest discomfort         HPI:    No cp or holland. No orthopnea or pnd. No palpitations or syncope. Past Medical History, Past Surgical History, Family history, Social History, and Medications were all reviewed with the patient today and updated as necessary. Current Outpatient Medications   Medication Sig Dispense Refill    Multiple Vitamins-Minerals (PRESERVISION AREDS 2 PO) Take by mouth in the morning and at bedtime      docusate sodium (COLACE) 100 MG capsule Take 100 mg by mouth every evening      isosorbide mononitrate (IMDUR) 30 MG extended release tablet Take 1 tablet by mouth daily 90 tablet 3    metoprolol tartrate (LOPRESSOR) 25 MG tablet Take 0.5 tablets by mouth 2 times daily 60 tablet 11    nitroGLYCERIN (NITROSTAT) 0.4 MG SL tablet up to max of 3 total doses.  If no relief after 1 dose, call 911. 25 tablet 3    amiodarone (CORDARONE) 200 MG tablet Take 100 mg by mouth daily      amLODIPine (NORVASC) 10 MG tablet Take 10 mg by mouth daily      aspirin 81 MG EC tablet Take by mouth daily      Calcium Carb-Cholecalciferol (OYSTER SHELL CALCIUM) 500-400 MG-UNIT TABS Take by mouth      cimetidine (TAGAMET) 200 MG tablet Take 400 mg by mouth 2 times daily      diclofenac sodium (VOLTAREN) 1 % GEL Apply topically 3 times daily as needed      ezetimibe (ZETIA) 10 MG tablet Take 10 mg by mouth daily      hydrALAZINE (APRESOLINE) 50 MG tablet TAKE 1 TABLET BY MOUTH TWICE A DAY      latanoprost (XALATAN) 0.005 % ophthalmic solution Apply 1 drop to eye      magnesium oxide (MAG-OX) 400 (240 Mg) MG tablet Take 400 mg by mouth 2 times daily  omeprazole (PRILOSEC OTC) 20 MG tablet Take 20 mg by mouth daily      polyethylene glycol (GLYCOLAX) 17 GM/SCOOP powder Take 17 g by mouth daily      spironolactone (ALDACTONE) 25 MG tablet TAKE 1/2 TABLET BY MOUTH DAILY      vitamin E 400 UNIT capsule Take 400 Units by mouth daily       No current facility-administered medications for this visit. Social History     Tobacco Use    Smoking status: Never Smoker    Smokeless tobacco: Never Used   Substance Use Topics    Alcohol use: No     Alcohol/week: 0.0 standard drinks              PHYSICAL EXAM:   BP (!) 130/58   Pulse 54   Ht 5' 1\" (1.549 m)   Wt 123 lb (55.8 kg)   BMI 23.24 kg/m²    Constitutional: Oriented to person, place, and time. Appears well-developed and well-nourished. Head: Normocephalic and atraumatic. Neck: Neck supple. Cardiovascular: Normal rate and regular rhythm with no murmur -No JVP  Pulmonary/Chest: Breath sounds normal.   Abdominal: Soft. Musculoskeletal: No edema. Neurological: Alert and oriented to person, place, and time. Skin: Skin is warm and dry. Psychiatric: Normal mood and affect. Vitals reviewed. Wt Readings from Last 3 Encounters:   06/27/22 123 lb (55.8 kg)   06/08/22 117 lb (53.1 kg)   05/11/22 121 lb (54.9 kg)       Medical problems and test results were reviewed with the patient today. ASSESSMENT and PLAN    1. Sinus bradycardia  Some leighton - hr low at time- decrease lopressor to 12.5mg bid    2. Palpitations  Stable. Continue lopressor      3. Mixed hyperlipidemia  Stable. Continue zetia      4. Dizziness  Improved with current therapy. Will continue medications    5. CP  Resolved. Continue current meds   needs tight cp control- continue asa       Return in about 3 months (around 9/27/2022).          Ion Anderson MD  6/27/2022  3:25 PM

## 2022-07-25 ENCOUNTER — TELEPHONE (OUTPATIENT)
Dept: CARDIOLOGY CLINIC | Age: 87
End: 2022-07-25

## 2022-07-25 NOTE — TELEPHONE ENCOUNTER
Went to [employer] over the weekend. Took her off metoprolol and spironolactone. Grand daughter Rylan Morris would like to know what Doctor Maricarmen Cortes thinks of these changes.

## 2022-07-26 ENCOUNTER — OFFICE VISIT (OUTPATIENT)
Dept: CARDIOLOGY CLINIC | Age: 87
End: 2022-07-26
Payer: MEDICARE

## 2022-07-26 VITALS
BODY MASS INDEX: 22.09 KG/M2 | SYSTOLIC BLOOD PRESSURE: 118 MMHG | HEIGHT: 61 IN | HEART RATE: 64 BPM | WEIGHT: 117 LBS | DIASTOLIC BLOOD PRESSURE: 62 MMHG

## 2022-07-26 DIAGNOSIS — R00.2 PALPITATIONS: Primary | ICD-10-CM

## 2022-07-26 DIAGNOSIS — R42 DIZZINESS: ICD-10-CM

## 2022-07-26 DIAGNOSIS — E78.2 MIXED HYPERLIPIDEMIA: ICD-10-CM

## 2022-07-26 PROCEDURE — 1036F TOBACCO NON-USER: CPT | Performed by: INTERNAL MEDICINE

## 2022-07-26 PROCEDURE — 1090F PRES/ABSN URINE INCON ASSESS: CPT | Performed by: INTERNAL MEDICINE

## 2022-07-26 PROCEDURE — 99214 OFFICE O/P EST MOD 30 MIN: CPT | Performed by: INTERNAL MEDICINE

## 2022-07-26 PROCEDURE — 1123F ACP DISCUSS/DSCN MKR DOCD: CPT | Performed by: INTERNAL MEDICINE

## 2022-07-26 PROCEDURE — G8420 CALC BMI NORM PARAMETERS: HCPCS | Performed by: INTERNAL MEDICINE

## 2022-07-26 PROCEDURE — G8428 CUR MEDS NOT DOCUMENT: HCPCS | Performed by: INTERNAL MEDICINE

## 2022-07-26 RX ORDER — FUROSEMIDE 40 MG/1
40 TABLET ORAL AS NEEDED
Status: ON HOLD | COMMUNITY
End: 2022-08-06 | Stop reason: HOSPADM

## 2022-07-26 NOTE — PROGRESS NOTES
[address] [place] [address], [address] [place]  [facility], [address] [place] [address]  PHONE: [phone]      22    NAME:  Dakota Barr  : 1928  MRN: [id number]       SUBJECTIVE:   Dakota Barr is a 80 y.o. female seen for a follow up visit regarding the following:     Chief Complaint   Patient presents with    Follow-Up from Hospital     CHF         HPI:    No cp or holland. No orthopnea or pnd. No palpitations or syncope. Was hospitalized with likely af and then leighton-metoprolol stopped and K 5.8 and aldactone stopped. no     Past Medical History, Past Surgical History, Family history, Social History, and Medications were all reviewed with the patient today and updated as necessary. Current Outpatient Medications   Medication Sig Dispense Refill    furosemide (LASIX) 40 MG tablet Take 40 mg by mouth as needed      Multiple Vitamins-Minerals (PRESERVISION AREDS 2 PO) Take by mouth in the morning and at bedtime      docusate sodium (COLACE) 100 MG capsule Take 100 mg by mouth every evening      isosorbide mononitrate (IMDUR) 30 MG extended release tablet Take 1 tablet by mouth daily 90 tablet 3    nitroGLYCERIN (NITROSTAT) 0.4 MG SL tablet up to max of 3 total doses.  If no relief after 1 dose, call 911. 25 tablet 3    amiodarone (CORDARONE) 200 MG tablet Take 100 mg by mouth daily      amLODIPine (NORVASC) 10 MG tablet Take 5 mg by mouth in the morning and at bedtime      aspirin 81 MG EC tablet Take by mouth daily      diclofenac sodium (VOLTAREN) 1 % GEL Apply topically 3 times daily as needed      ezetimibe (ZETIA) 10 MG tablet Take 10 mg by mouth daily      hydrALAZINE (APRESOLINE) 50 MG tablet TAKE 1 TABLET BY MOUTH TWICE A DAY      latanoprost (XALATAN) 0.005 % ophthalmic solution Apply 1 drop to eye      omeprazole (PRILOSEC OTC) 20 MG tablet Take 20 mg by mouth daily      polyethylene glycol (GLYCOLAX) 17 GM/SCOOP powder Take 17 g by mouth daily      Calcium Carb-Cholecalciferol (OYSTER SHELL CALCIUM) 500-400 MG-UNIT TABS Take by mouth (Patient not taking: Reported on 7/26/2022)      magnesium oxide (MAG-OX) 400 (240 Mg) MG tablet Take 400 mg by mouth 2 times daily (Patient not taking: Reported on 7/26/2022)      vitamin E 400 UNIT capsule Take 400 Units by mouth daily (Patient not taking: Reported on 7/26/2022)       No current facility-administered medications for this visit. Social History     Tobacco Use    Smoking status: Never    Smokeless tobacco: Never   Substance Use Topics    Alcohol use: No     Alcohol/week: 0.0 standard drinks              PHYSICAL EXAM:   /62   Pulse 64   Ht 5' 1\" (1.549 m)   Wt 117 lb (53.1 kg)   BMI 22.11 kg/m²    Constitutional: Oriented to person, place, and time. Appears well-developed and well-nourished. Head: Normocephalic and atraumatic. Neck: Neck supple. Cardiovascular: Normal rate and regular rhythm with no murmur -No JVP  Pulmonary/Chest: Breath sounds normal.   Abdominal: Soft. Musculoskeletal: No edema. Neurological: Alert and oriented to person, place, and time. Skin: Skin is warm and dry. Psychiatric: Normal mood and affect. Vitals reviewed. Wt Readings from Last 3 Encounters:   07/26/22 117 lb (53.1 kg)   06/27/22 123 lb (55.8 kg)   06/08/22 117 lb (53.1 kg)       Medical problems and test results were reviewed with the patient today. ASSESSMENT and PLAN    1. Palpitations/AF  Stable. Continue amio and increase to 200mg qd to try to suppress af with amio stopped         2. Mixed hyperlipidemia  Stable. Continue norvasc      3. Dizziness  Improved with current therapy. Will continue medications         Return in about 4 weeks (around 8/23/2022).          Geovanni Mendez MD  7/26/2022  1:17 PM

## 2022-08-04 ENCOUNTER — TELEPHONE (OUTPATIENT)
Dept: CARDIOLOGY CLINIC | Age: 87
End: 2022-08-04

## 2022-08-04 ENCOUNTER — HOSPITAL ENCOUNTER (INPATIENT)
Age: 87
LOS: 2 days | Discharge: HOME HEALTH | DRG: 243 | End: 2022-08-06
Attending: INTERNAL MEDICINE | Admitting: INTERNAL MEDICINE
Payer: MEDICARE

## 2022-08-04 DIAGNOSIS — R00.1 BRADYCARDIA: ICD-10-CM

## 2022-08-04 PROCEDURE — 1100000003 HC PRIVATE W/ TELEMETRY

## 2022-08-04 NOTE — Clinical Note
Prepped: left chest. Site was prepped. Prepped with: ChloraPrep. Wet prep elapsed drying time: 3 mins. Patient was draped after wet prep solution dried.

## 2022-08-04 NOTE — Clinical Note
TRANSFER - OUT REPORT:     Verbal report given to: cpru rn. Report consisted of patient's Situation, Background, Assessment and   Recommendations(SBAR). Opportunity for questions and clarification was provided. Patient transported with a Registered Nurse and [address] [place] [address] / [facility] [employer]. Patient transported to: [facility].

## 2022-08-04 NOTE — Clinical Note
Contrast Dose Calculator:   Patient's age: 80.   Patient's sex: Female. Patient weight (kg) = 51.3. Creatinine level (mg/dL) = 1.4. Creatinine clearance (mL/min): 20.   Contrast concentration (mg/mL) = 370. MACD = 148.55 mL. Max Contrast dose per Creatinine Cl calculator = 45 mL.

## 2022-08-04 NOTE — Clinical Note
TRANSFER - IN REPORT:     Verbal report received from: 320 rn. Report consisted of patient's Situation, Background, Assessment and   Recommendations(SBAR). Opportunity for questions and clarification was provided. Assessment completed upon patient's arrival to unit and care assumed. Patient transported with a Registered Nurse and [address] [place] [address] / [facility] Social IQ (Social Influence Quotient).

## 2022-08-04 NOTE — TELEPHONE ENCOUNTER
Daughter reports pt. is grumpy,achy around her neck/shoulders,little sleepy. /73 hr 40. She is achy in neck,shoulders and her chest.She denies SOB. Last night /81 hr=51. Her main concern is her lower HR. She is c/o CP off/on but not current. Meds:Lasix 40mg qday PRN,Imdur 30mg qday,Amiodarone 200mg qday,Norvasc 5mg bid,Hydralazine 50mg bid. I had her walk and she had CP,I advised ER fu now. Pt.daughter v/u.

## 2022-08-04 NOTE — Clinical Note
Accessed site: vein. Using fluoro guidance. Number of attempts: 1. Accessed successfully.  Micro wire inserted

## 2022-08-04 NOTE — Clinical Note
A Bovie was used. Blend setting: blend. Mode: bipolar. Coagulation Settin. Cut Settin. Site (pad location): upper leg. Laterality: right.

## 2022-08-05 ENCOUNTER — APPOINTMENT (OUTPATIENT)
Dept: GENERAL RADIOLOGY | Age: 87
DRG: 243 | End: 2022-08-05
Attending: INTERNAL MEDICINE
Payer: MEDICARE

## 2022-08-05 PROBLEM — R00.1 SYMPTOMATIC BRADYCARDIA: Status: ACTIVE | Noted: 2022-08-05

## 2022-08-05 LAB
ANION GAP SERPL CALC-SCNC: 3 MMOL/L (ref 7–16)
BUN SERPL-MCNC: 22 MG/DL (ref 8–23)
CALCIUM SERPL-MCNC: 8.6 MG/DL (ref 8.3–10.4)
CHLORIDE SERPL-SCNC: 107 MMOL/L (ref 98–107)
CO2 SERPL-SCNC: 24 MMOL/L (ref 21–32)
CREAT SERPL-MCNC: 1.4 MG/DL (ref 0.6–1)
ECHO BSA: 1.49 M2
GLUCOSE SERPL-MCNC: 88 MG/DL (ref 65–100)
MAGNESIUM SERPL-MCNC: 2.2 MG/DL (ref 1.8–2.4)
POTASSIUM SERPL-SCNC: 4 MMOL/L (ref 3.5–5.1)
SODIUM SERPL-SCNC: 134 MMOL/L (ref 136–145)

## 2022-08-05 PROCEDURE — 80048 BASIC METABOLIC PNL TOTAL CA: CPT

## 2022-08-05 PROCEDURE — C1785 PMKR, DUAL, RATE-RESP: HCPCS | Performed by: INTERNAL MEDICINE

## 2022-08-05 PROCEDURE — 0JH606Z INSERTION OF PACEMAKER, DUAL CHAMBER INTO CHEST SUBCUTANEOUS TISSUE AND FASCIA, OPEN APPROACH: ICD-10-PCS | Performed by: INTERNAL MEDICINE

## 2022-08-05 PROCEDURE — 2500000003 HC RX 250 WO HCPCS: Performed by: INTERNAL MEDICINE

## 2022-08-05 PROCEDURE — 6370000000 HC RX 637 (ALT 250 FOR IP): Performed by: NURSE PRACTITIONER

## 2022-08-05 PROCEDURE — 02HK3JZ INSERTION OF PACEMAKER LEAD INTO RIGHT VENTRICLE, PERCUTANEOUS APPROACH: ICD-10-PCS | Performed by: INTERNAL MEDICINE

## 2022-08-05 PROCEDURE — 2709999900 HC NON-CHARGEABLE SUPPLY: Performed by: INTERNAL MEDICINE

## 2022-08-05 PROCEDURE — 2580000003 HC RX 258: Performed by: NURSE PRACTITIONER

## 2022-08-05 PROCEDURE — 1100000003 HC PRIVATE W/ TELEMETRY

## 2022-08-05 PROCEDURE — C1898 LEAD, PMKR, OTHER THAN TRANS: HCPCS | Performed by: INTERNAL MEDICINE

## 2022-08-05 PROCEDURE — 2580000003 HC RX 258: Performed by: INTERNAL MEDICINE

## 2022-08-05 PROCEDURE — C1894 INTRO/SHEATH, NON-LASER: HCPCS | Performed by: INTERNAL MEDICINE

## 2022-08-05 PROCEDURE — L3660 SO 8 AB RSTR CAN/WEB PRE OTS: HCPCS | Performed by: INTERNAL MEDICINE

## 2022-08-05 PROCEDURE — 99153 MOD SED SAME PHYS/QHP EA: CPT | Performed by: INTERNAL MEDICINE

## 2022-08-05 PROCEDURE — C1892 INTRO/SHEATH,FIXED,PEEL-AWAY: HCPCS | Performed by: INTERNAL MEDICINE

## 2022-08-05 PROCEDURE — 99152 MOD SED SAME PHYS/QHP 5/>YRS: CPT | Performed by: INTERNAL MEDICINE

## 2022-08-05 PROCEDURE — 02H63JZ INSERTION OF PACEMAKER LEAD INTO RIGHT ATRIUM, PERCUTANEOUS APPROACH: ICD-10-PCS | Performed by: INTERNAL MEDICINE

## 2022-08-05 PROCEDURE — 33208 INSRT HEART PM ATRIAL & VENT: CPT | Performed by: INTERNAL MEDICINE

## 2022-08-05 PROCEDURE — 36415 COLL VENOUS BLD VENIPUNCTURE: CPT

## 2022-08-05 PROCEDURE — 99223 1ST HOSP IP/OBS HIGH 75: CPT | Performed by: INTERNAL MEDICINE

## 2022-08-05 PROCEDURE — 6360000002 HC RX W HCPCS: Performed by: INTERNAL MEDICINE

## 2022-08-05 PROCEDURE — 83735 ASSAY OF MAGNESIUM: CPT

## 2022-08-05 PROCEDURE — 71045 X-RAY EXAM CHEST 1 VIEW: CPT

## 2022-08-05 DEVICE — LEAD 5076-45 MRI US RCMCRD
Type: IMPLANTABLE DEVICE | Status: FUNCTIONAL
Brand: CAPSUREFIX NOVUS MRI™ SURESCAN®

## 2022-08-05 DEVICE — LEAD 5076-52 MRI US RCMCRD
Type: IMPLANTABLE DEVICE | Status: FUNCTIONAL
Brand: CAPSUREFIX NOVUS MRI™ SURESCAN®

## 2022-08-05 DEVICE — IPG W1DR01 AZURE XT DR MRI USA
Type: IMPLANTABLE DEVICE | Status: FUNCTIONAL
Brand: AZURE™ XT DR MRI SURESCAN™

## 2022-08-05 RX ORDER — NITROGLYCERIN 0.4 MG/1
0.4 TABLET SUBLINGUAL EVERY 5 MIN PRN
Status: DISCONTINUED | OUTPATIENT
Start: 2022-08-05 | End: 2022-08-06 | Stop reason: HOSPADM

## 2022-08-05 RX ORDER — SODIUM CHLORIDE 9 MG/ML
INJECTION, SOLUTION INTRAVENOUS PRN
Status: DISCONTINUED | OUTPATIENT
Start: 2022-08-05 | End: 2022-08-06 | Stop reason: HOSPADM

## 2022-08-05 RX ORDER — POTASSIUM CHLORIDE 20 MEQ/1
40 TABLET, EXTENDED RELEASE ORAL PRN
Status: DISCONTINUED | OUTPATIENT
Start: 2022-08-05 | End: 2022-08-06 | Stop reason: HOSPADM

## 2022-08-05 RX ORDER — SODIUM CHLORIDE 0.9 % (FLUSH) 0.9 %
5-40 SYRINGE (ML) INJECTION PRN
Status: DISCONTINUED | OUTPATIENT
Start: 2022-08-05 | End: 2022-08-06 | Stop reason: HOSPADM

## 2022-08-05 RX ORDER — ACETAMINOPHEN 650 MG/1
650 SUPPOSITORY RECTAL EVERY 6 HOURS PRN
Status: DISCONTINUED | OUTPATIENT
Start: 2022-08-05 | End: 2022-08-06 | Stop reason: HOSPADM

## 2022-08-05 RX ORDER — LIDOCAINE HYDROCHLORIDE 10 MG/ML
INJECTION, SOLUTION INFILTRATION; PERINEURAL PRN
Status: DISCONTINUED | OUTPATIENT
Start: 2022-08-05 | End: 2022-08-05 | Stop reason: HOSPADM

## 2022-08-05 RX ORDER — ONDANSETRON 2 MG/ML
4 INJECTION INTRAMUSCULAR; INTRAVENOUS EVERY 4 HOURS PRN
Status: DISCONTINUED | OUTPATIENT
Start: 2022-08-05 | End: 2022-08-06 | Stop reason: HOSPADM

## 2022-08-05 RX ORDER — EZETIMIBE 10 MG/1
10 TABLET ORAL DAILY
Status: DISCONTINUED | OUTPATIENT
Start: 2022-08-05 | End: 2022-08-06 | Stop reason: HOSPADM

## 2022-08-05 RX ORDER — POTASSIUM CHLORIDE 7.45 MG/ML
10 INJECTION INTRAVENOUS PRN
Status: DISCONTINUED | OUTPATIENT
Start: 2022-08-05 | End: 2022-08-06 | Stop reason: HOSPADM

## 2022-08-05 RX ORDER — MIDAZOLAM HYDROCHLORIDE 1 MG/ML
INJECTION INTRAMUSCULAR; INTRAVENOUS PRN
Status: DISCONTINUED | OUTPATIENT
Start: 2022-08-05 | End: 2022-08-05 | Stop reason: HOSPADM

## 2022-08-05 RX ORDER — HYDRALAZINE HYDROCHLORIDE 20 MG/ML
10 INJECTION INTRAMUSCULAR; INTRAVENOUS EVERY 6 HOURS PRN
Status: DISCONTINUED | OUTPATIENT
Start: 2022-08-05 | End: 2022-08-06 | Stop reason: HOSPADM

## 2022-08-05 RX ORDER — MAGNESIUM SULFATE IN WATER 40 MG/ML
2000 INJECTION, SOLUTION INTRAVENOUS PRN
Status: DISCONTINUED | OUTPATIENT
Start: 2022-08-05 | End: 2022-08-06 | Stop reason: HOSPADM

## 2022-08-05 RX ORDER — AMIODARONE HYDROCHLORIDE 200 MG/1
200 TABLET ORAL DAILY
Status: DISCONTINUED | OUTPATIENT
Start: 2022-08-05 | End: 2022-08-06 | Stop reason: HOSPADM

## 2022-08-05 RX ORDER — POLYETHYLENE GLYCOL 3350 17 G/17G
17 POWDER, FOR SOLUTION ORAL DAILY PRN
Status: DISCONTINUED | OUTPATIENT
Start: 2022-08-05 | End: 2022-08-06 | Stop reason: HOSPADM

## 2022-08-05 RX ORDER — PANTOPRAZOLE SODIUM 40 MG/1
40 TABLET, DELAYED RELEASE ORAL
Status: DISCONTINUED | OUTPATIENT
Start: 2022-08-05 | End: 2022-08-06 | Stop reason: HOSPADM

## 2022-08-05 RX ORDER — ASPIRIN 81 MG/1
81 TABLET ORAL DAILY
Status: DISCONTINUED | OUTPATIENT
Start: 2022-08-05 | End: 2022-08-06 | Stop reason: HOSPADM

## 2022-08-05 RX ORDER — ACETAMINOPHEN 325 MG/1
650 TABLET ORAL EVERY 6 HOURS PRN
Status: DISCONTINUED | OUTPATIENT
Start: 2022-08-05 | End: 2022-08-06 | Stop reason: HOSPADM

## 2022-08-05 RX ORDER — SODIUM CHLORIDE 0.9 % (FLUSH) 0.9 %
5-40 SYRINGE (ML) INJECTION EVERY 12 HOURS SCHEDULED
Status: DISCONTINUED | OUTPATIENT
Start: 2022-08-05 | End: 2022-08-06 | Stop reason: HOSPADM

## 2022-08-05 RX ADMIN — SODIUM CHLORIDE, PRESERVATIVE FREE 10 ML: 5 INJECTION INTRAVENOUS at 20:15

## 2022-08-05 RX ADMIN — EZETIMIBE 10 MG: 10 TABLET ORAL at 09:40

## 2022-08-05 RX ADMIN — SODIUM CHLORIDE, PRESERVATIVE FREE 10 ML: 5 INJECTION INTRAVENOUS at 09:41

## 2022-08-05 RX ADMIN — SODIUM CHLORIDE, PRESERVATIVE FREE 10 ML: 5 INJECTION INTRAVENOUS at 20:14

## 2022-08-05 RX ADMIN — PANTOPRAZOLE SODIUM 40 MG: 40 TABLET, DELAYED RELEASE ORAL at 05:12

## 2022-08-05 RX ADMIN — ACETAMINOPHEN 650 MG: 325 TABLET, FILM COATED ORAL at 13:34

## 2022-08-05 RX ADMIN — ASPIRIN 81 MG: 81 TABLET ORAL at 09:40

## 2022-08-05 RX ADMIN — AMIODARONE HYDROCHLORIDE 200 MG: 200 TABLET ORAL at 09:40

## 2022-08-05 RX ADMIN — ACETAMINOPHEN 650 MG: 325 TABLET, FILM COATED ORAL at 20:14

## 2022-08-05 ASSESSMENT — ENCOUNTER SYMPTOMS
RESPIRATORY NEGATIVE: 1
GASTROINTESTINAL NEGATIVE: 1
ALLERGIC/IMMUNOLOGIC NEGATIVE: 1
EYES NEGATIVE: 1

## 2022-08-05 ASSESSMENT — PAIN DESCRIPTION - ONSET: ONSET: GRADUAL

## 2022-08-05 ASSESSMENT — PAIN DESCRIPTION - FREQUENCY: FREQUENCY: INTERMITTENT

## 2022-08-05 ASSESSMENT — PAIN DESCRIPTION - LOCATION
LOCATION: SHOULDER
LOCATION: NECK

## 2022-08-05 ASSESSMENT — PAIN SCALES - GENERAL
PAINLEVEL_OUTOF10: 3
PAINLEVEL_OUTOF10: 3

## 2022-08-05 ASSESSMENT — PAIN DESCRIPTION - PAIN TYPE: TYPE: SURGICAL PAIN

## 2022-08-05 ASSESSMENT — PAIN DESCRIPTION - DESCRIPTORS
DESCRIPTORS: ACHING
DESCRIPTORS: ACHING;SORE

## 2022-08-05 ASSESSMENT — PAIN DESCRIPTION - ORIENTATION: ORIENTATION: LEFT

## 2022-08-05 ASSESSMENT — PAIN - FUNCTIONAL ASSESSMENT: PAIN_FUNCTIONAL_ASSESSMENT: ACTIVITIES ARE NOT PREVENTED

## 2022-08-05 NOTE — PROGRESS NOTES
TRANSFER - IN REPORT:    Verbal report received from Joceline, [facility] on Dorothy Abrams  being received from [place] [facility], ED for routine progression of patient care      Report consisted of patient's Situation, Background, Assessment and   Recommendations(SBAR). Information from the following report(s) ED SBAR was reviewed with the receiving nurse. Opportunity for questions and clarification was provided. Assessment completed upon patient's arrival to unit and care assumed. Pt arrived to floor and oriented to room. Placed on eagle and tele monitor. Bed low and locked, call light in reach. Dual skin assessment completed with Jose Nguyen RN. Pt's skin is warm, dry, and fragile. Scattered bruising present. Heels and sacrum are intact. No pressure injuries noted.

## 2022-08-05 NOTE — PROGRESS NOTES
TRANSFER - OUT REPORT:    Verbal report given to RN on Indiana Modi  being transferred to [facility] for routine progression of patient care       Report consisted of patient's Situation, Background, Assessment and   Recommendations(SBAR). Information from the following report(s) Nurse Handoff Report was reviewed with the receiving nurse. Lines:   Peripheral IV 08/04/22 Distal;Left;Ventral Wrist (Active)   Site Assessment Clean, dry & intact 08/05/22 0830   Line Status Flushed;Capped 08/05/22 0830   Line Care Connections checked and tightened 08/05/22 0830   Phlebitis Assessment No symptoms 08/05/22 0830   Infiltration Assessment 0 08/05/22 0830   Alcohol Cap Used Yes 08/05/22 0830   Dressing Status Clean, dry & intact 08/05/22 0830   Dressing Type Transparent 08/05/22 0830        Opportunity for questions and clarification was provided.

## 2022-08-05 NOTE — PROGRESS NOTES
TRANSFER - OUT REPORT:    Verbal report given to Leila Ocampo RN on Indiana Modi  being transferred to [facility] for routine progression of patient care       Report consisted of patient's Situation, Background, Assessment and   Recommendations(SBAR). Information from the following report(s) Nurse Handoff Report was reviewed with the receiving nurse.     [employer] pacemaker insertion with Dr Rebekah Titus: DDDR   Left chest incision closed with sutures  Covered with primaseal  Versed 1mg  Ancef 2g  Pt tolerated well

## 2022-08-05 NOTE — H&P
[facility] Cardiology History & Physical      Date of  Admission: 8/4/2022 11:42 PM     Primary Care Physician: Vanna Hannah NP  Primary Cardiologist: Dr. Myriam Galvan Physician: Dr. Austin Freitas    CC: chest pain, bradycardia    HPI:  Dorothy Abrams is a 80 y.o. female with past medical history PAF, HLD, HTN and bradycardia who presented to the ER at [facility] via EMS with complaints of chest pain. Patient was recently admitted to [facility] with bradycardia. At the time she was found to be hyperkalemic. All rate slowing medications were stopped and electrolytes were stablized. She was seen in the office by primary cardiologist on 7/26/22 and her amiodarone dose was increased to 200mg daily in attempt maintain SR. Tonight when she was seen in the ER, she was noted to be in AFIB w slow ventricular response. Her granddaughter reported chest pain earlier in the day when patient stood up but BP was significantly elevated as well. Patient was transferred to [facility] and admitted to telemetry for continued care. Upon arrival to the telemetry unit, patient is SB on the monitor with rate of 51. She denies chest pain currently.       Past Medical History:   Diagnosis Date    Atrial fibrillation ([address]     EKG 10/18/18- sinus bradycardia; followed by Dr Dianne Garcia ([facility]); wore holter monitor 9/2018 for palpitations    Benign essential HTN 5/11/2016    Bradycardia     rate usually 47-52    Chest pain 05/11/2016    no current symptoms 12/5/18    CKD (chronic kidney disease), stage IV ([facility])     creatinine result 1.6 (11/30/18); followed by PCP    Dizziness 7/1/2016    Edema 05/11/2016    hx of- pt reports no edema currently (12/5/18)    HLD (hyperlipidemia)     Hyponatremia     last hospitalization 2/2018- granddaughter reports r/t medicine at time; PCP reports \"relatively stable\" bet 127-129    LBBB (left bundle branch block)     Palpitations 5/11/2016    Rectocele     Uterovaginal prolapse       Past Surgical History:   Procedure Laterality Date    CYST REMOVAL  1962    left breast    GYN  12/18/2018    [place] colpocleisis, midurethral sling and cystoscopy- Dr. Brandon Manzanares   Allergen Reactions    Cephalexin Other (See Comments)    Codeine Other (See Comments)    Lisinopril Other (See Comments)    Sulfa Antibiotics Other (See Comments)      Social History     Socioeconomic History    Marital status:      Spouse name: Not on file    Number of children: Not on file    Years of education: Not on file    Highest education level: Not on file   Occupational History    Not on file   Tobacco Use    Smoking status: Never    Smokeless tobacco: Never   Substance and Sexual Activity    Alcohol use: No     Alcohol/week: 0.0 standard drinks    Drug use: No    Sexual activity: Not on file   Other Topics Concern    Not on file   Social History Narrative    Not on file     Social Determinants of Health     Financial Resource Strain: Not on file   Food Insecurity: Not on file   Transportation Needs: Not on file   Physical Activity: Not on file   Stress: Not on file   Social Connections: Not on file   Intimate Partner Violence: Not on file   Housing Stability: Not on file     No family history on file. No current facility-administered medications for this encounter. Review of Systems    Review of Systems   Constitutional: Negative. HENT: Negative. Eyes: Negative. Cardiovascular:  Positive for chest pain. Respiratory: Negative. Endocrine: Negative. Hematologic/Lymphatic: Negative. Skin: Negative. Musculoskeletal: Negative. Gastrointestinal: Negative. Genitourinary: Negative. Neurological: Negative. Psychiatric/Behavioral: Negative. Allergic/Immunologic: Negative.           Subjective:   BP (!) 181/60   Pulse 59   Temp 98.1 °F (36.7 °C) (Oral)   Resp 17   Ht 5' 1\" (1.549 m)   Wt 113 lb (51.3 kg)   SpO2 97%   BMI 21.35 kg/m²   Physical Exam  HENT: Mouth/Throat:      Mouth: Mucous membranes are moist.   Eyes:      Pupils: Pupils are equal, round, and reactive to light. Cardiovascular:      Rate and Rhythm: Regular rhythm. Bradycardia present. Heart sounds: Murmur heard. Pulmonary:      Breath sounds: Normal breath sounds. Abdominal:      General: Bowel sounds are normal.   Musculoskeletal:         General: No swelling. Skin:     General: Skin is warm and dry. Neurological:      Mental Status: She is alert and oriented to person, place, and time. Psychiatric:         Mood and Affect: Mood normal.        Cardiographics  Telemetry:  sinus bradycardia  ECG: slow AFIB from [facility]  Echocardiogram:  from 6/8/2022    Left Ventricle: Normal left ventricular systolic function with a visually estimated EF of 60 - 65%. Left ventricle size is normal. Normal wall thickness. Normal wall motion. Abnormal diastolic function. Left Atrium: Left atrium is severely dilated. Aortic Valve: Valve structure is normal. Moderate sclerosis of the aortic valve cusp. Mild regurgitation    Labs: from [facility] ER: WBC 9.6, HGB 11.0, HCT 33.2, plt 342, sodium 131, potassium 4.4, BUN 30, creatinine 2.15, troponin 0.04    Patient has been seen and examined by Dr. Bobby Dodd and he agrees with the following assessment and plan:     Assessment/Plan:       Symptomatic bradycardia -- admit to telemetry. Not on rate slowing medications, just amiodarone. NPO now. Plan for PPM in the AM      HLD (hyperlipidemia) -- continue Zetia. Intolerant to all statins.           ELIZABETH Morrow CNP  8/5/2022 12:56 AM

## 2022-08-05 NOTE — CARE COORDINATION
Patient transferred from [facility] with [facility] for PPI. Patient underwent the procedure this AM.  CM reviewed patient's chart for discharge needs. CM remains available to assist with any needs at discharge.

## 2022-08-06 VITALS
HEART RATE: 71 BPM | RESPIRATION RATE: 20 BRPM | SYSTOLIC BLOOD PRESSURE: 128 MMHG | DIASTOLIC BLOOD PRESSURE: 65 MMHG | TEMPERATURE: 97.3 F | WEIGHT: 111.6 LBS | OXYGEN SATURATION: 97 % | HEIGHT: 61 IN | BODY MASS INDEX: 21.07 KG/M2

## 2022-08-06 PROBLEM — R00.1 SYMPTOMATIC BRADYCARDIA: Status: RESOLVED | Noted: 2022-08-05 | Resolved: 2022-08-06

## 2022-08-06 PROBLEM — Z95.0 PACEMAKER: Status: ACTIVE | Noted: 2022-08-06

## 2022-08-06 PROCEDURE — 6370000000 HC RX 637 (ALT 250 FOR IP): Performed by: NURSE PRACTITIONER

## 2022-08-06 PROCEDURE — 99232 SBSQ HOSP IP/OBS MODERATE 35: CPT | Performed by: INTERNAL MEDICINE

## 2022-08-06 PROCEDURE — 2580000003 HC RX 258: Performed by: INTERNAL MEDICINE

## 2022-08-06 RX ORDER — AMLODIPINE BESYLATE 10 MG/1
10 TABLET ORAL DAILY
Status: DISCONTINUED | OUTPATIENT
Start: 2022-08-06 | End: 2022-08-06 | Stop reason: HOSPADM

## 2022-08-06 RX ORDER — ISOSORBIDE MONONITRATE 30 MG/1
30 TABLET, EXTENDED RELEASE ORAL DAILY
Status: DISCONTINUED | OUTPATIENT
Start: 2022-08-06 | End: 2022-08-06 | Stop reason: HOSPADM

## 2022-08-06 RX ORDER — HYDRALAZINE HYDROCHLORIDE 50 MG/1
50 TABLET, FILM COATED ORAL 2 TIMES DAILY
Status: DISCONTINUED | OUTPATIENT
Start: 2022-08-06 | End: 2022-08-06 | Stop reason: HOSPADM

## 2022-08-06 RX ADMIN — AMLODIPINE BESYLATE 10 MG: 10 TABLET ORAL at 11:13

## 2022-08-06 RX ADMIN — ISOSORBIDE MONONITRATE 30 MG: 30 TABLET, EXTENDED RELEASE ORAL at 11:12

## 2022-08-06 RX ADMIN — AMIODARONE HYDROCHLORIDE 200 MG: 200 TABLET ORAL at 11:13

## 2022-08-06 RX ADMIN — PANTOPRAZOLE SODIUM 40 MG: 40 TABLET, DELAYED RELEASE ORAL at 05:14

## 2022-08-06 RX ADMIN — SODIUM CHLORIDE, PRESERVATIVE FREE 10 ML: 5 INJECTION INTRAVENOUS at 11:18

## 2022-08-06 RX ADMIN — ACETAMINOPHEN 650 MG: 325 TABLET, FILM COATED ORAL at 05:14

## 2022-08-06 RX ADMIN — ACETAMINOPHEN 650 MG: 325 TABLET, FILM COATED ORAL at 13:49

## 2022-08-06 RX ADMIN — HYDRALAZINE HYDROCHLORIDE 50 MG: 50 TABLET, FILM COATED ORAL at 06:06

## 2022-08-06 RX ADMIN — EZETIMIBE 10 MG: 10 TABLET ORAL at 11:13

## 2022-08-06 RX ADMIN — ASPIRIN 81 MG: 81 TABLET ORAL at 11:13

## 2022-08-06 ASSESSMENT — PAIN DESCRIPTION - DESCRIPTORS
DESCRIPTORS: SORE
DESCRIPTORS: ACHING;SORE

## 2022-08-06 ASSESSMENT — PAIN SCALES - GENERAL
PAINLEVEL_OUTOF10: 3
PAINLEVEL_OUTOF10: 0
PAINLEVEL_OUTOF10: 3

## 2022-08-06 ASSESSMENT — PAIN DESCRIPTION - LOCATION
LOCATION: SHOULDER
LOCATION: SHOULDER

## 2022-08-06 ASSESSMENT — PAIN DESCRIPTION - ORIENTATION
ORIENTATION: LEFT
ORIENTATION: LEFT

## 2022-08-06 NOTE — CARE COORDINATION
Discharge order is in. Pt is discharging home in stable condition. No discharge needs identified. Tx goals met. 1455-Pt is current with [facility] per family. SABRINA sent for RN/PT/OT and [facility] aid.        08/06/22 1110   Services At/After Discharge   Transition of Care Consult (CM Consult) Discharge [place] [address] Discharge None   [place] Resource Information Provided? No   Mode of Transport at Discharge Other (see comment)  (Family)   Confirm Follow Up Transport Family   Condition of Participation: Discharge Planning   The Patient and/or Patient Representative was provided with a Choice of Provider? Patient   The Patient and/Or Patient Representative agree with the Discharge Plan? Yes   Freedom of Choice list was provided with basic dialogue that supports the patient's individualized plan of care/goals, treatment preferences, and shares the quality data associated with the providers?   Yes

## 2022-08-06 NOTE — PROGRESS NOTES
am  8/6/2022 7:01 AM    Admit Date: 8/4/2022    Admit Diagnosis: Symptomatic bradycardia [R00.1]      Subjective:    Patient : Patient patient presented with symptomatic bradycardia, for which she has been hospitalized multiple times. She had a  pacemaker placed for appropriate rate and rhythm management. Patient reported dysuria starting this morning that is better now. Objective:    BP (!) 177/78   Pulse 60   Temp 97.3 °F (36.3 °C) (Axillary)   Resp 18   Ht 5' 1\" (1.549 m)   Wt 111 lb 9.6 oz (50.6 kg)   SpO2 96%   BMI 21.09 kg/m²     ROS:  General ROS: negative for - chills  Hematological and Lymphatic ROS: negative for - blood clots or jaundice  Respiratory ROS: no cough, shortness of breath, or wheezing  Cardiovascular ROS: positive for - chest pain at pacemaker insertion site, nonanginal  negative for - dyspnea on exertion  Gastrointestinal ROS: no abdominal pain, change in bowel habits, or black or bloody stools  Neurological ROS: no TIA or stroke symptoms    Physical Exam:      Physical Examination: General appearance - Appearance: alert, well appearing, and in no distress.    Neck/lymph - supple, no significant adenopathy  Chest/CV - clear to auscultation, no wheezes, rales or rhonchi, symmetric air entry  Heart - normal rate and regular rhythm  Abdomen/GI - soft, nontender, nondistended, no masses or organomegaly   Musculoskeletal - no joint tenderness, deformity or swelling  Extremities - peripheral pulses normal, no pedal edema, no clubbing or cyanosis  Skin - normal coloration and turgor, no rashes, no suspicious skin lesions noted    Current Facility-Administered Medications   Medication Dose Route Frequency    hydrALAZINE (APRESOLINE) tablet 50 mg  50 mg Oral BID    amiodarone (CORDARONE) tablet 200 mg  200 mg Oral Daily    aspirin EC tablet 81 mg  81 mg Oral Daily    ezetimibe (ZETIA) tablet 10 mg  10 mg Oral Daily    pantoprazole (PROTONIX) tablet 40 mg  40 mg Oral QAM AC    sodium chloride flush 0.9 % injection 5-40 mL  5-40 mL IntraVENous 2 times per day    sodium chloride flush 0.9 % injection 5-40 mL  5-40 mL IntraVENous PRN    ondansetron (ZOFRAN) injection 4 mg  4 mg IntraVENous Q4H PRN    acetaminophen (TYLENOL) tablet 650 mg  650 mg Oral Q6H PRN    Or    acetaminophen (TYLENOL) suppository 650 mg  650 mg Rectal Q6H PRN    polyethylene glycol (GLYCOLAX) packet 17 g  17 g Oral Daily PRN    nitroGLYCERIN (NITROSTAT) SL tablet 0.4 mg  0.4 mg SubLINGual Q5 Min PRN    potassium chloride (KLOR-CON M) extended release tablet 40 mEq  40 mEq Oral PRN    Or    potassium bicarb-citric acid (EFFER-K) effervescent tablet 40 mEq  40 mEq Oral PRN    Or    potassium chloride 10 mEq/100 mL IVPB (Peripheral Line)  10 mEq IntraVENous PRN    magnesium sulfate 2000 mg in 50 mL IVPB premix  2,000 mg IntraVENous PRN    hydrALAZINE (APRESOLINE) injection 10 mg  10 mg IntraVENous Q6H PRN    sodium chloride flush 0.9 % injection 5-40 mL  5-40 mL IntraVENous 2 times per day    sodium chloride flush 0.9 % injection 5-40 mL  5-40 mL IntraVENous PRN    0.9 % sodium chloride infusion   IntraVENous PRN       Data Review: data included in this note has been independently reviewed by the author     TELEMETRY: NSR    Assessment/Plan:     Patient Active Problem List   Diagnosis    Palpitations    Uterovaginal prolapse, complete    Dizziness    HLD (hyperlipidemia)    Sinus bradycardia    Symptomatic bradycardia     PLAN  Rate and rhythm are stable after pacemaker implantation. BP is stable at 177/78 on hydralazine. Continue medications taking current medications. Patient is ready for discharge. Follow up in 4 weeks with [facility] cardiology.              Elinor Boyd

## 2022-08-06 NOTE — PLAN OF CARE
Problem: Discharge Planning  Goal: Discharge to home or other facility with appropriate resources  Outcome: Completed     Problem: Safety - Adult  Goal: Free from fall injury  Outcome: Completed     Problem: ABCDS Injury Assessment  Goal: Absence of physical injury  Outcome: Completed     Problem: Pain  Goal: Verbalizes/displays adequate comfort level or baseline comfort level  Outcome: Completed

## 2022-08-06 NOTE — DISCHARGE SUMMARY
[facility] Cardiology Discharge Summary     Patient ID:  Gi Auguste  [id number]  62 y.o.  8/17/1928    Admit date: 8/4/2022    Discharge date:  8/6/2022    Admitting Physician: Joseph Cortez MD     Discharge Physician: ELIZABETH Chaudhary CNP/Dr. [address]    Admission Diagnoses: Symptomatic bradycardia [R00.1]    Discharge Diagnoses:   Patient Active Problem List    Diagnosis    Symptomatic bradycardia    Sinus bradycardia    Sick sinus symdrome    HLD (hyperlipidemia)    Dizziness    Palpitations       Cardiology Procedures:   Device implantation , Medtronic DC PPM  Consults: none    Hospital Course: Patient presented to the ED with complaints of bradycardia and was subsequently scheduled for a device implantation at [facility] on 8/5/22. Patient was taken to the cath lab and underwent successful implantation of a Medtronic DC PPM by Dr. [address] Patient tolerated the procedure well and was taken to the Telemetry floor for recovery. Follow up chest xray showed no pneumothorax. The following morning patient was up feeling well without any complaints of chest pain, shortness of breath, or palpitations. Patient's left subclavian site was clean, dry and intact without hematoma. Patient's labs were stable. Device interrogation showed appropriate function. Patient was seen and examined by Dr. Edna Morrison and determined stable and ready for discharge. Patient was instructed on the importance of medication compliance and outpatient follow up. The patient will follow up with [facility] Cardiology for device check. [facility] Cardiology office has been informed you need a follow up appointment after discharge. You will be called on Monday with the follow up appointment. If you have NOT heard from our office by Tuesday, please contact our office for appointment at [phone].        DISPOSITION: Patient has been instructed to keep affected arm below shoulder level for the next 4 weeks or until cleared by doctor. The arm sling should be worn while sleeping. The dressing will be removed at follow up appointment. The incision site must be kept clean and dry. The patient may shower in a few days. Lotions, powders, or creams should be avoided as these can increase the risk of infection. The affected arm should not be used for any pushing, pulling, or lifting until cleared by doctor. Driving is prohibited until cleared by doctor in a follow up appointment. Any signs of infection including increased redness, suspicious drainage, or unexplained fever should be reported to the doctor immediately by calling [phone]. Discharge Exam: BP (!) 177/78   Pulse 60   Temp 97.3 °F (36.3 °C) (Axillary)   Resp 18   Ht 5' 1\" (1.549 m)   Wt 111 lb 9.6 oz (50.6 kg)   SpO2 96%   BMI 21.09 kg/m²       Pt has been seen by Dr. Diana Miner: see his progress note for exam details. Recent Results (from the past 24 hour(s))   Electrophysiology procedure    Collection Time: 08/05/22 11:25 AM   Result Value Ref Range    Body Surface Area 1.49 m2         Patient Instructions:     Current Discharge Medication List        CONTINUE these medications which have NOT CHANGED    Details   Multiple Vitamins-Minerals (PRESERVISION AREDS 2 PO) Take by mouth in the morning and at bedtime      docusate sodium (COLACE) 100 MG capsule Take 100 mg by mouth every evening      isosorbide mononitrate (IMDUR) 30 MG extended release tablet Take 1 tablet by mouth daily  Qty: 90 tablet, Refills: 3      nitroGLYCERIN (NITROSTAT) 0.4 MG SL tablet up to max of 3 total doses. If no relief after 1 dose, call 504. Qty: 25 tablet, Refills: 3      amiodarone (CORDARONE) 200 MG tablet Take 200 mg by mouth in the morning.       amLODIPine (NORVASC) 10 MG tablet Take 5 mg by mouth in the morning and at bedtime      aspirin 81 MG EC tablet Take by mouth daily      diclofenac sodium (VOLTAREN) 1 % GEL Apply topically 3 times daily as needed      ezetimibe (ZETIA) 10 MG tablet Take 10 mg by mouth daily      hydrALAZINE (APRESOLINE) 50 MG tablet TAKE 1 TABLET BY MOUTH TWICE A DAY      latanoprost (XALATAN) 0.005 % ophthalmic solution Apply 1 drop to eye      omeprazole (PRILOSEC OTC) 20 MG tablet Take 20 mg by mouth daily      polyethylene glycol (GLYCOLAX) 17 GM/SCOOP powder Take 17 g by mouth daily           STOP taking these medications       furosemide (LASIX) 40 MG tablet Comments:   Reason for Stopping:         Calcium Carb-Cholecalciferol (OYSTER SHELL CALCIUM) 500-400 MG-UNIT TABS Comments:   Reason for Stopping:         magnesium oxide (MAG-OX) 400 (240 Mg) MG tablet Comments:   Reason for Stopping:         vitamin E 400 UNIT capsule Comments:   Reason for [address] will be discharged with an occlusive dressing over the incision site. This dressing will be removed at the 10 -14-day postop site check with the [address]. Your new device will be checked at that visit and all your device teaching will be given. Keep the incision site dry until your follow up. Use a hand-held shower or bath. Do not submerge or soak in pools or tubs for 6 weeks. If you are discharged with a prescription for antibiotics, please take the full prescription until it is gone. After your site check, you may shower and get the incision site wet. You may let soap and water run on the incision and pat dry. Please do not apply creams, lotions or powders on or near the incision. Mild bruising and swelling can be normal after implant and will resolve in a few weeks. Call the office at [phone] if you have any of the following:  -Signs of infection, such as fever over 100 F, drainage from the incision, redness, significant swelling, or warmth at the incision site.  -Significant pain around the site that gets worse.  Mild discomfort can be normal.   -Bleeding from contraindicated. --Cellular Phones should be used with the hand opposite to the side where the device was implanted. The phone should not be carried in the pocket on the side of the device. --CDL licenses are not renewed if you have a defibrillator implanted. --Radiation Therapy should be avoided on or near the device. --Should you require surgery in the future; some electrosurgical devices can interfere with the device function. You should discuss this with your surgeon prior to any operation. --If you are ordered an MRI, Please contact the clinic prior to ensure you have an MRI safe device. You must wait 6 weeks after implant before you may have an MRI. --Tens units are contraindicated.                                                        Device Clinic [phone]    ELIZABETH Chairez CNP  08/06/22  8:10 AM

## 2022-08-09 ENCOUNTER — TELEPHONE (OUTPATIENT)
Dept: CARDIOLOGY CLINIC | Age: 87
End: 2022-08-09

## 2022-08-09 NOTE — PROGRESS NOTES
Physician Progress Note      PATIENT:               Taryn Gonzalez  CSN #:                  [id number]  :                       1928  ADMIT DATE:       2022 11:42 PM  [address] DATE:        2022 5:34 PM  RESPONDING  PROVIDER #:        DANNY JOHNSON-GILDA          QUERY TEXT:    Patient admitted with bradycardia, noted to have CKD IV in their past medical   history. If possible, please document in progress notes and discharge summary   if you are evaluating and/or treating any of the following: The medical record reflects the following:  Risk Factors: 80 y.o. female with past medical history PAF, HLD, HTN and   bradycardia  Clinical Indicators: Past medical history \" CKD (chronic kidney disease),   stage IV (HCC)\",  GFR 20,  GFR 25,  GFR 37  Treatment: Labs    Thank you,  Kandy King RN, BSN, CDI  [email or web address]  . Options provided:  -- CKD Stage 1 GFR>90  -- CKD Stage 2 GFR 60-90  -- CKD Stage 3a GFR 45-59  -- CKD Stage 3b GFR 30-44  -- CKD Stage 4 GFR 15-29  -- CKD Stage 5 GFR<15  -- ESRD  -- Other - I will add my own diagnosis  -- Disagree - Not applicable / Not valid  -- Disagree - Clinically unable to determine / Unknown  -- Refer to Clinical Documentation Reviewer    PROVIDER RESPONSE TEXT:    This patient has CKD Stage 4.     Query created by: Ronal Lara on 2022 8:54 AM      Electronically signed by:  Debi MANCERA 2022 11:41 PM

## 2022-08-09 NOTE — TELEPHONE ENCOUNTER
Please call daughter re: this patient had pacemaker put in and she has had acid reflux since then. Will Dr Timothy Lepe call something in for her acid reflux? Cherelle Alonso is what was called in by her PCP and it was very expensive.

## 2022-08-10 ENCOUNTER — TELEPHONE (OUTPATIENT)
Dept: CARDIOLOGY CLINIC | Age: 87
End: 2022-08-10

## 2022-08-10 RX ORDER — PANTOPRAZOLE SODIUM 40 MG/1
40 TABLET, DELAYED RELEASE ORAL
Qty: 90 TABLET | Refills: 0 | Status: CANCELLED | OUTPATIENT
Start: 2022-08-10

## 2022-08-10 NOTE — TELEPHONE ENCOUNTER
Verb ok per pt to talk to granddaughter, Caroline Marcum RN. Caroline Marcum states pt's BP consistently running low. 114/59, 131/74, 133/61, 143/58, 116/72, 110/68, 97/62. More SBP readings in high 90s recently   Which BP meds should be adjusted? Hydralazine 50mg BID  Amlodipine 5mg BID  Imdur 30mg daily  Amio 200mg     Amio increased from 100mg daily. Caroline Marcum thinks increased Amio causing nausea. Caroline Marcum reports pt belching, emesis x1 today. Nausea since PPM procedure. Today is 1st day of Protonix. Pt also has Zofran per PCP. Took 1/2 tab.  Concerned about potential  prolonged QT interval.

## 2022-08-11 RX ORDER — PANTOPRAZOLE SODIUM 40 MG/1
40 TABLET, DELAYED RELEASE ORAL
Qty: 30 TABLET | Refills: 3 | Status: SHIPPED | OUTPATIENT
Start: 2022-08-11

## 2022-08-15 ENCOUNTER — PATIENT MESSAGE (OUTPATIENT)
Dept: CARDIOLOGY CLINIC | Age: 87
End: 2022-08-15

## 2022-08-15 NOTE — TELEPHONE ENCOUNTER
From: Lauryn Allison  To: Dr. Montes Half: 8/15/2022 9:59 AM EDT  Subject: BP readings    Please see attached BP readings. BP is still low, so we wanted to reach out to see if there are any other changes that need to be made to my medicine. Please let us know. Thank you!

## 2022-08-24 ENCOUNTER — OFFICE VISIT (OUTPATIENT)
Dept: CARDIOLOGY CLINIC | Age: 87
End: 2022-08-24
Payer: MEDICARE

## 2022-08-24 VITALS
DIASTOLIC BLOOD PRESSURE: 62 MMHG | HEIGHT: 61 IN | HEART RATE: 88 BPM | SYSTOLIC BLOOD PRESSURE: 128 MMHG | WEIGHT: 112 LBS | BODY MASS INDEX: 21.14 KG/M2

## 2022-08-24 DIAGNOSIS — E78.2 MIXED HYPERLIPIDEMIA: ICD-10-CM

## 2022-08-24 DIAGNOSIS — R42 DIZZINESS: ICD-10-CM

## 2022-08-24 DIAGNOSIS — R00.2 PALPITATIONS: ICD-10-CM

## 2022-08-24 DIAGNOSIS — R00.1 SINUS BRADYCARDIA: Primary | ICD-10-CM

## 2022-08-24 PROCEDURE — 99214 OFFICE O/P EST MOD 30 MIN: CPT | Performed by: INTERNAL MEDICINE

## 2022-08-24 PROCEDURE — 1123F ACP DISCUSS/DSCN MKR DOCD: CPT | Performed by: INTERNAL MEDICINE

## 2022-08-24 PROCEDURE — G8427 DOCREV CUR MEDS BY ELIG CLIN: HCPCS | Performed by: INTERNAL MEDICINE

## 2022-08-24 PROCEDURE — 1090F PRES/ABSN URINE INCON ASSESS: CPT | Performed by: INTERNAL MEDICINE

## 2022-08-24 PROCEDURE — 1036F TOBACCO NON-USER: CPT | Performed by: INTERNAL MEDICINE

## 2022-08-24 PROCEDURE — 1111F DSCHRG MED/CURRENT MED MERGE: CPT | Performed by: INTERNAL MEDICINE

## 2022-08-24 PROCEDURE — G8420 CALC BMI NORM PARAMETERS: HCPCS | Performed by: INTERNAL MEDICINE

## 2022-08-24 NOTE — PROGRESS NOTES
[address], [facility] [address]  PHONE: [phone]      22    NAME:  Cora Grant  : 1928  MRN: [id number]       SUBJECTIVE:   Cora Grant is a 80 y.o. female seen for a follow up visit regarding the following:     Chief Complaint   Patient presents with    Irregular Heart Beat     4 wk f/u          HPI:    No cp or holland. No orthopnea or pnd. No palpitations or syncope. Past Medical History, Past Surgical History, Family history, Social History, and Medications were all reviewed with the patient today and updated as necessary. Current Outpatient Medications   Medication Sig Dispense Refill    pantoprazole (PROTONIX) 40 MG tablet Take 1 tablet by mouth every morning (before breakfast) 30 tablet 3    Multiple Vitamins-Minerals (PRESERVISION AREDS 2 PO) Take by mouth in the morning and at bedtime      docusate sodium (COLACE) 100 MG capsule Take 100 mg by mouth every evening      isosorbide mononitrate (IMDUR) 30 MG extended release tablet Take 1 tablet by mouth daily 90 tablet 3    nitroGLYCERIN (NITROSTAT) 0.4 MG SL tablet up to max of 3 total doses. If no relief after 1 dose, call 911. 25 tablet 3    amiodarone (CORDARONE) 200 MG tablet Take 200 mg by mouth in the morning. amLODIPine (NORVASC) 10 MG tablet Take 5 mg by mouth in the morning. aspirin 81 MG EC tablet Take by mouth daily      diclofenac sodium (VOLTAREN) 1 % GEL Apply topically 3 times daily as needed      ezetimibe (ZETIA) 10 MG tablet Take 10 mg by mouth daily      hydrALAZINE (APRESOLINE) 50 MG tablet TAKE 1 TABLET BY MOUTH TWICE A DAY      latanoprost (XALATAN) 0.005 % ophthalmic solution Apply 1 drop to eye      polyethylene glycol (GLYCOLAX) 17 GM/SCOOP powder Take 17 g by mouth daily       No current facility-administered medications for this visit.                Social History     Tobacco Use    Smoking status: Never    Smokeless tobacco: Never   Substance Use Topics    Alcohol use: No     Alcohol/week: 0.0 standard drinks              PHYSICAL EXAM:   /62   Pulse 88   Ht 5' 1\" (1.549 m)   Wt 112 lb (50.8 kg)   BMI 21.16 kg/m²    Constitutional: Oriented to person, place, and time. Appears well-developed and well-nourished. Head: Normocephalic and atraumatic. Neck: Neck supple. Cardiovascular: Normal rate and regular rhythm with no murmur -No JVP  Pulmonary/Chest: Breath sounds normal.   Abdominal: Soft. Musculoskeletal: No edema. Neurological: Alert and oriented to person, place, and time. Skin: Skin is warm and dry. Psychiatric: Normal mood and affect. Vitals reviewed. Wt Readings from Last 3 Encounters:   08/24/22 112 lb (50.8 kg)   08/06/22 111 lb 9.6 oz (50.6 kg)   07/26/22 117 lb (53.1 kg)       Medical problems and test results were reviewed with the patient today. ASSESSMENT and PLAN    1. Sinus bradycardia  Stable. Continue amio- s/p pm  bmp    2. Mixed hyperlipidemia  Stable. Continue zetai      3. Dizziness  Stable. Continue to monitor and decrease bp meds      4. Palpitations  resolved. Continue amio     5. HTN  Stable. Continue hydralazine and norvasc      Return in about 3 months (around 11/24/2022).          Jw Andrade MD  8/24/2022  2:52 PM

## 2022-10-24 RX ORDER — AMIODARONE HYDROCHLORIDE 200 MG/1
200 TABLET ORAL DAILY
Qty: 90 TABLET | Refills: 2 | Status: SHIPPED | OUTPATIENT
Start: 2022-10-24

## 2022-10-24 NOTE — TELEPHONE ENCOUNTER
Patients' daughter called stating  [facility] [place] changed her moms dosage directions.  Patients daughter states she needs a new Rx for :    amiodarone (CORDARONE) 200 MG tablet  # 90 day supply        Pharmacy    [address] [facility]    [phone]

## 2022-10-24 NOTE — TELEPHONE ENCOUNTER
Requested Prescriptions     Pending Prescriptions Disp Refills    amiodarone (CORDARONE) 200 MG tablet 90 tablet 2     Sig: Take 1 tablet by mouth daily

## 2022-11-16 ENCOUNTER — TELEPHONE (OUTPATIENT)
Dept: CARDIOLOGY CLINIC | Age: 87
End: 2022-11-16

## 2022-11-16 NOTE — TELEPHONE ENCOUNTER
Wants to ask about her Amlodipine cutting the pills in half because she only takes 5mg.  Please call

## 2023-01-20 ENCOUNTER — OFFICE VISIT (OUTPATIENT)
Dept: CARDIOLOGY CLINIC | Age: 88
End: 2023-01-20

## 2023-01-20 VITALS
WEIGHT: 109 LBS | SYSTOLIC BLOOD PRESSURE: 130 MMHG | HEIGHT: 61 IN | BODY MASS INDEX: 20.58 KG/M2 | HEART RATE: 92 BPM | DIASTOLIC BLOOD PRESSURE: 66 MMHG

## 2023-01-20 DIAGNOSIS — R42 DIZZINESS: Primary | ICD-10-CM

## 2023-01-20 DIAGNOSIS — I10 ESSENTIAL HYPERTENSION, BENIGN: ICD-10-CM

## 2023-01-20 DIAGNOSIS — R00.2 PALPITATIONS: ICD-10-CM

## 2023-01-20 DIAGNOSIS — E78.2 MIXED HYPERLIPIDEMIA: ICD-10-CM

## 2023-01-20 RX ORDER — GABAPENTIN 100 MG/1
CAPSULE ORAL
COMMUNITY
Start: 2022-12-26

## 2023-01-20 RX ORDER — ONDANSETRON 8 MG/1
TABLET, ORALLY DISINTEGRATING ORAL
COMMUNITY
Start: 2022-12-22

## 2023-01-20 RX ORDER — ACETAMINOPHEN 500 MG
500 TABLET ORAL EVERY 6 HOURS PRN
COMMUNITY

## 2023-01-20 RX ORDER — LIDOCAINE 50 MG/G
PATCH TOPICAL
COMMUNITY
Start: 2022-12-19

## 2023-01-20 NOTE — PROGRESS NOTES
[address], [address], [address] [place] [address]  PHONE: [phone]      23    NAME:  Clive Duran  : 1928  MRN: [id number]       SUBJECTIVE:   Clive Duran is a 80 y.o. female seen for a follow up visit regarding the following:     Chief Complaint   Patient presents with    Palpitations         HPI:    No cp or holland. No orthopnea or pnd. No palpitations or syncope. Past Medical History, Past Surgical History, Family history, Social History, and Medications were all reviewed with the patient today and updated as necessary. Current Outpatient Medications   Medication Sig Dispense Refill    acetaminophen (TYLENOL) 500 MG tablet Take 500 mg by mouth every 6 hours as needed for Pain      gabapentin (NEURONTIN) 100 MG capsule TAKE 2 CAPSULES (200 MG) NIGHTLY      lidocaine (LIDODERM) 5 % PLACE 1 PATCH ON THE SKIN DAILY REMOVE & DISCARD PATCH WITHIN 12 HOURS OR AS DIRECTED BY MD      ondansetron (ZOFRAN-ODT) 8 MG TBDP disintegrating tablet TAKE 1 TABLET (8 MG) BY MOUTH 3 (THREE) TIMES A DAY AS NEEDED FOR NAUSEA OR VOMITING      amiodarone (CORDARONE) 200 MG tablet Take 1 tablet by mouth daily 90 tablet 2    pantoprazole (PROTONIX) 40 MG tablet Take 1 tablet by mouth every morning (before breakfast) 30 tablet 3    Multiple Vitamins-Minerals (PRESERVISION AREDS 2 PO) Take by mouth in the morning and at bedtime      docusate sodium (COLACE) 100 MG capsule Take 100 mg by mouth every evening      isosorbide mononitrate (IMDUR) 30 MG extended release tablet Take 1 tablet by mouth daily 90 tablet 3    nitroGLYCERIN (NITROSTAT) 0.4 MG SL tablet up to max of 3 total doses. If no relief after 1 dose, call 911. 25 tablet 3    amLODIPine (NORVASC) 10 MG tablet Take 5 mg by mouth in the morning.       aspirin 81 MG EC tablet Take by mouth daily      diclofenac sodium (VOLTAREN) 1 % GEL Apply topically 3 times daily as needed      ezetimibe (ZETIA) 10 MG tablet Take 10 mg by mouth daily      hydrALAZINE (APRESOLINE) 50 MG tablet TAKE 1 TABLET BY MOUTH TWICE A DAY      latanoprost (XALATAN) 0.005 % ophthalmic solution Apply 1 drop to eye      polyethylene glycol (GLYCOLAX) 17 GM/SCOOP powder Take 17 g by mouth daily       No current facility-administered medications for this visit. Social History     Tobacco Use    Smoking status: Never    Smokeless tobacco: Never   Substance Use Topics    Alcohol use: No     Alcohol/week: 0.0 standard drinks              PHYSICAL EXAM:   /66   Pulse 92   Ht 5' 1\" (1.549 m)   Wt 109 lb (49.4 kg)   BMI 20.60 kg/m²    Constitutional: Oriented to person, place, and time. Appears well-developed and well-nourished. Head: Normocephalic and atraumatic. Neck: Neck supple. Cardiovascular: Normal rate and regular rhythm with no murmur -No JVP  Pulmonary/Chest: Breath sounds normal.   Abdominal: Soft. Musculoskeletal: No edema. Neurological: Alert and oriented to person, place, and time. Skin: Skin is warm and dry. Psychiatric: Normal mood and affect. Vitals reviewed. Wt Readings from Last 3 Encounters:   01/20/23 109 lb (49.4 kg)   08/24/22 112 lb (50.8 kg)   08/06/22 111 lb 9.6 oz (50.6 kg)       Medical problems and test results were reviewed with the patient today. ASSESSMENT and PLAN    1. Dizziness  Resolved. Continue current meds      2. Mixed hyperlipidemia  Stable. Continue zetia      3. Palpitations  Stable. Continue amiio      4. Essential hypertension, benign  Stable. Continue norvasc  Bp well controlled         Return in about 6 months (around 7/20/2023).          Jean Paul Cosby MD  1/20/2023  11:03 AM

## 2023-03-08 DIAGNOSIS — Z95.0 PACEMAKER: Primary | ICD-10-CM

## 2023-03-08 DIAGNOSIS — R00.1 SINUS BRADYCARDIA: ICD-10-CM

## 2023-04-25 DIAGNOSIS — R00.1 SINUS BRADYCARDIA: ICD-10-CM

## 2023-04-25 DIAGNOSIS — Z95.0 PACEMAKER: ICD-10-CM

## 2023-04-25 DIAGNOSIS — Z95.0 PACEMAKER: Primary | ICD-10-CM

## 2023-04-25 RX ORDER — AMIODARONE HYDROCHLORIDE 200 MG/1
200 TABLET ORAL DAILY
Qty: 90 TABLET | Refills: 2 | Status: SHIPPED | OUTPATIENT
Start: 2023-04-25

## 2023-04-25 NOTE — TELEPHONE ENCOUNTER
Received follow up letter from Dr Paulina Haynes via fax and put in Dr Rivera Crimes bin in [place]     Thank you Requested Prescriptions     Pending Prescriptions Disp Refills    amiodarone (CORDARONE) 200 MG tablet 90 tablet 2     Sig: Take 1 tablet by mouth daily

## 2023-05-16 RX ORDER — AMLODIPINE BESYLATE 10 MG/1
TABLET ORAL
Qty: 90 TABLET | Refills: 3 | Status: SHIPPED | OUTPATIENT
Start: 2023-05-16

## 2023-05-16 NOTE — TELEPHONE ENCOUNTER
Requested Prescriptions     Pending Prescriptions Disp Refills    amLODIPine (NORVASC) 10 MG tablet [Pharmacy Med Name: AMLODIPINE BESYLATE 10 MG TAB] 90 tablet 3     Sig: TAKE 1 TABLET BY MOUTH EVERY DAY

## 2023-06-01 ENCOUNTER — TELEPHONE (OUTPATIENT)
Age: 88
End: 2023-06-01

## 2023-06-01 NOTE — TELEPHONE ENCOUNTER
Spoke with Kennedi at [facility], she states the only Amiodarone active is for 200 mg 1 qd.

## 2023-06-01 NOTE — TELEPHONE ENCOUNTER
Pt daughter state that when she goes to  pt medication Amiodarone sometime the direction states take 1 tablet daily and other times it states tablet a half tablet daily. Kathryn Holden states that it should state take 1 tablet daily. Kathryn Holden wants to make sure that the pharmacy knows which directions to use.

## 2023-06-21 PROCEDURE — 93296 REM INTERROG EVL PM/IDS: CPT | Performed by: INTERNAL MEDICINE

## 2023-06-21 PROCEDURE — 93294 REM INTERROG EVL PM/LDLS PM: CPT | Performed by: INTERNAL MEDICINE

## 2023-07-07 RX ORDER — HYDRALAZINE HYDROCHLORIDE 50 MG/1
TABLET, FILM COATED ORAL
Qty: 180 TABLET | Refills: 3 | Status: SHIPPED | OUTPATIENT
Start: 2023-07-07

## 2023-07-07 RX ORDER — ISOSORBIDE MONONITRATE 30 MG/1
TABLET, EXTENDED RELEASE ORAL
Qty: 90 TABLET | Refills: 3 | Status: SHIPPED | OUTPATIENT
Start: 2023-07-07

## 2023-08-02 ENCOUNTER — TELEPHONE (OUTPATIENT)
Age: 88
End: 2023-08-02

## 2023-08-02 NOTE — TELEPHONE ENCOUNTER
Amrik Ramos NP [facility] reports SANDER, Cr 1.9. Now 1.4   Lasix, Aldactone and Norvasc held. Stable wt and edema. Euvolemic. Increased Isosorbide 30mg to 60mg for BP control with holding other meds. 8/30/23 FU Dr. Gerson Gasca. Family asked Amrik Ramos NP to call with update.  cgh

## 2023-08-30 ENCOUNTER — OFFICE VISIT (OUTPATIENT)
Age: 88
End: 2023-08-30
Payer: MEDICARE

## 2023-08-30 VITALS
SYSTOLIC BLOOD PRESSURE: 138 MMHG | DIASTOLIC BLOOD PRESSURE: 70 MMHG | HEIGHT: 61 IN | BODY MASS INDEX: 21.9 KG/M2 | HEART RATE: 70 BPM | WEIGHT: 116 LBS

## 2023-08-30 DIAGNOSIS — E78.2 MIXED HYPERLIPIDEMIA: ICD-10-CM

## 2023-08-30 DIAGNOSIS — Z95.0 PACEMAKER: ICD-10-CM

## 2023-08-30 DIAGNOSIS — R00.2 PALPITATIONS: ICD-10-CM

## 2023-08-30 DIAGNOSIS — I10 ESSENTIAL HYPERTENSION, BENIGN: Primary | ICD-10-CM

## 2023-08-30 PROCEDURE — G8428 CUR MEDS NOT DOCUMENT: HCPCS | Performed by: INTERNAL MEDICINE

## 2023-08-30 PROCEDURE — G8420 CALC BMI NORM PARAMETERS: HCPCS | Performed by: INTERNAL MEDICINE

## 2023-08-30 PROCEDURE — 1090F PRES/ABSN URINE INCON ASSESS: CPT | Performed by: INTERNAL MEDICINE

## 2023-08-30 PROCEDURE — 99214 OFFICE O/P EST MOD 30 MIN: CPT | Performed by: INTERNAL MEDICINE

## 2023-08-30 PROCEDURE — 1036F TOBACCO NON-USER: CPT | Performed by: INTERNAL MEDICINE

## 2023-08-30 PROCEDURE — 1123F ACP DISCUSS/DSCN MKR DOCD: CPT | Performed by: INTERNAL MEDICINE

## 2023-08-30 RX ORDER — AMIODARONE HYDROCHLORIDE 200 MG/1
200 TABLET ORAL DAILY
Qty: 90 TABLET | Refills: 3 | Status: SHIPPED | OUTPATIENT
Start: 2023-08-30

## 2023-08-30 RX ORDER — AMLODIPINE BESYLATE 5 MG/1
5 TABLET ORAL DAILY
Qty: 90 TABLET | Refills: 3 | Status: SHIPPED | OUTPATIENT
Start: 2023-08-30

## 2023-08-30 NOTE — PROGRESS NOTES
[address] [facility]  [address] [facility], [address] [place] [address]  PHONE: [phone]      23    NAME:  Neida Joya  : 1928  MRN: [id number]       SUBJECTIVE:   Neida Joya is a 80 y.o. female seen for a follow up visit regarding the following:     Chief Complaint   Patient presents with    Hypertension         HPI:    No cp or holland. No orthopnea or pnd. No palpitations or syncope. Past Medical History, Past Surgical History, Family history, Social History, and Medications were all reviewed with the patient today and updated as necessary. Current Outpatient Medications   Medication Sig Dispense Refill    isosorbide mononitrate (IMDUR) 30 MG extended release tablet TAKE 1 TABLET BY MOUTH EVERY DAY 90 tablet 3    hydrALAZINE (APRESOLINE) 50 MG tablet TAKE 1 TABLET BY MOUTH TWICE A  tablet 3    amLODIPine (NORVASC) 10 MG tablet TAKE 1 TABLET BY MOUTH EVERY DAY (Patient taking differently: Take 0.5 tablets by mouth daily) 90 tablet 3    amiodarone (CORDARONE) 200 MG tablet Take 1 tablet by mouth daily 90 tablet 2    acetaminophen (TYLENOL) 500 MG tablet Take 1 tablet by mouth every 6 hours as needed for Pain      gabapentin (NEURONTIN) 100 MG capsule Take 1 capsule by mouth nightly. lidocaine (LIDODERM) 5 % PLACE 1 PATCH ON THE SKIN DAILY REMOVE & DISCARD PATCH WITHIN 12 HOURS OR AS DIRECTED BY MD      ondansetron (ZOFRAN-ODT) 8 MG TBDP disintegrating tablet TAKE 1 TABLET (8 MG) BY MOUTH 3 (THREE) TIMES A DAY AS NEEDED FOR NAUSEA OR VOMITING      pantoprazole (PROTONIX) 40 MG tablet Take 1 tablet by mouth every morning (before breakfast) 30 tablet 3    Multiple Vitamins-Minerals (PRESERVISION AREDS 2 PO) Take by mouth in the morning and at bedtime      docusate sodium (COLACE) 100 MG capsule Take 1 capsule by mouth every evening      nitroGLYCERIN (NITROSTAT) 0.4 MG SL tablet up to max of 3 total doses.  If no relief after 1 dose, call [phone]. 25 tablet 3    aspirin 81

## 2023-10-04 PROCEDURE — 93294 REM INTERROG EVL PM/LDLS PM: CPT | Performed by: INTERNAL MEDICINE

## 2023-10-04 PROCEDURE — 93296 REM INTERROG EVL PM/IDS: CPT | Performed by: INTERNAL MEDICINE

## 2024-03-03 ENCOUNTER — PATIENT MESSAGE (OUTPATIENT)
Age: 89
End: 2024-03-03

## 2024-03-04 RX ORDER — NITROGLYCERIN 0.4 MG/1
TABLET SUBLINGUAL
Qty: 25 TABLET | Refills: 3 | Status: SHIPPED | OUTPATIENT
Start: 2024-03-04

## 2024-03-04 NOTE — TELEPHONE ENCOUNTER
Requested Prescriptions     Pending Prescriptions Disp Refills    nitroGLYCERIN (NITROSTAT) 0.4 MG SL tablet 25 tablet 3     Sig: up to max of 3 total doses. If no relief after 1 dose, call 911.

## 2024-03-14 ENCOUNTER — OFFICE VISIT (OUTPATIENT)
Age: 89
End: 2024-03-14
Payer: MEDICARE

## 2024-03-14 VITALS
HEART RATE: 66 BPM | DIASTOLIC BLOOD PRESSURE: 62 MMHG | BODY MASS INDEX: 23 KG/M2 | WEIGHT: 121.8 LBS | HEIGHT: 61 IN | SYSTOLIC BLOOD PRESSURE: 138 MMHG

## 2024-03-14 DIAGNOSIS — E78.2 MIXED HYPERLIPIDEMIA: ICD-10-CM

## 2024-03-14 DIAGNOSIS — R60.0 LOCALIZED EDEMA: Primary | ICD-10-CM

## 2024-03-14 DIAGNOSIS — I10 ESSENTIAL HYPERTENSION, BENIGN: ICD-10-CM

## 2024-03-14 PROCEDURE — 1090F PRES/ABSN URINE INCON ASSESS: CPT | Performed by: INTERNAL MEDICINE

## 2024-03-14 PROCEDURE — G8420 CALC BMI NORM PARAMETERS: HCPCS | Performed by: INTERNAL MEDICINE

## 2024-03-14 PROCEDURE — 99214 OFFICE O/P EST MOD 30 MIN: CPT | Performed by: INTERNAL MEDICINE

## 2024-03-14 PROCEDURE — 1123F ACP DISCUSS/DSCN MKR DOCD: CPT | Performed by: INTERNAL MEDICINE

## 2024-03-14 PROCEDURE — G8484 FLU IMMUNIZE NO ADMIN: HCPCS | Performed by: INTERNAL MEDICINE

## 2024-03-14 PROCEDURE — G8427 DOCREV CUR MEDS BY ELIG CLIN: HCPCS | Performed by: INTERNAL MEDICINE

## 2024-03-14 PROCEDURE — 93000 ELECTROCARDIOGRAM COMPLETE: CPT | Performed by: INTERNAL MEDICINE

## 2024-03-14 PROCEDURE — 1036F TOBACCO NON-USER: CPT | Performed by: INTERNAL MEDICINE

## 2024-03-14 NOTE — PROGRESS NOTES
[facility] CARDIOLOGY  [address] [place] [address], [facility] [address]  [place] [address]  PHONE: [phone]      24    NAME:  Nataliya Hines  : 1928  MRN: [id number]       SUBJECTIVE:   Nataliya Hines is a 89 y.o. female seen for a follow up visit regarding the following:     Chief Complaint   Patient presents with    Hypertension         HPI:    No cp or holland. No orthopnea or pnd. No palpitations or syncope.      Past Medical History, Past Surgical History, Family history, Social History, and Medications were all reviewed with the patient today and updated as necessary.     Current Outpatient Medications   Medication Sig Dispense Refill    nitroGLYCERIN (NITROSTAT) 0.4 MG SL tablet up to max of 3 total doses. If no relief after 1 dose, call 911. 25 tablet 3    amLODIPine (NORVASC) 5 MG tablet Take 1 tablet by mouth daily 90 tablet 3    amiodarone (CORDARONE) 200 MG tablet Take 1 tablet by mouth daily 90 tablet 3    isosorbide mononitrate (IMDUR) 30 MG extended release tablet TAKE 1 TABLET BY MOUTH EVERY DAY 90 tablet 3    hydrALAZINE (APRESOLINE) 50 MG tablet TAKE 1 TABLET BY MOUTH TWICE A  tablet 3    acetaminophen (TYLENOL) 500 MG tablet Take 1 tablet by mouth every 6 hours as needed for Pain      gabapentin (NEURONTIN) 100 MG capsule Take 1 capsule by mouth nightly.      lidocaine (LIDODERM) 5 % PLACE 1 PATCH ON THE SKIN DAILY REMOVE & DISCARD PATCH WITHIN 12 HOURS OR AS DIRECTED BY MD      ondansetron (ZOFRAN-ODT) 8 MG TBDP disintegrating tablet TAKE 1 TABLET (8 MG) BY MOUTH 3 (THREE) TIMES A DAY AS NEEDED FOR NAUSEA OR VOMITING      pantoprazole (PROTONIX) 40 MG tablet Take 1 tablet by mouth every morning (before breakfast) 30 tablet 3    Multiple Vitamins-Minerals (PRESERVISION AREDS 2 PO) Take by mouth in the morning and at bedtime      docusate sodium (COLACE) 100 MG capsule Take 1 capsule by mouth every evening      aspirin 81 MG EC tablet Take by mouth daily      diclofenac sodium (VOLTAREN) 1

## 2024-05-13 ENCOUNTER — NURSE ONLY (OUTPATIENT)
Age: 89
End: 2024-05-13

## 2024-05-13 DIAGNOSIS — R00.1 SINUS BRADYCARDIA: Primary | ICD-10-CM

## 2024-05-16 RX ORDER — ISOSORBIDE MONONITRATE 30 MG/1
TABLET, EXTENDED RELEASE ORAL
Qty: 90 TABLET | Refills: 3 | Status: SHIPPED | OUTPATIENT
Start: 2024-05-16

## 2024-06-13 PROCEDURE — 93294 REM INTERROG EVL PM/LDLS PM: CPT | Performed by: INTERNAL MEDICINE

## 2024-06-13 PROCEDURE — 93296 REM INTERROG EVL PM/IDS: CPT | Performed by: INTERNAL MEDICINE

## 2024-08-27 RX ORDER — AMIODARONE HYDROCHLORIDE 200 MG/1
200 TABLET ORAL DAILY
Qty: 90 TABLET | Refills: 2 | Status: SHIPPED | OUTPATIENT
Start: 2024-08-27

## 2024-08-28 RX ORDER — AMIODARONE HYDROCHLORIDE 200 MG/1
200 TABLET ORAL DAILY
Qty: 90 TABLET | Refills: 3 | OUTPATIENT
Start: 2024-08-28

## 2024-08-28 NOTE — TELEPHONE ENCOUNTER
Requested Prescriptions     Refused Prescriptions Disp Refills    amiodarone (CORDARONE) 200 MG tablet [Pharmacy Med Name: AMIODARONE  MG TABLET] 90 tablet 3     Sig: TAKE 1 TABLET BY MOUTH EVERY DAY   Authorized on 08/27/2024 as requested              Medication  amiodarone (CORDARONE) 200 MG tablet [9066]  amiodarone (CORDARONE) 200 MG tablet [[id number]]    Order Details  Dose: 200 mg Route: Oral Frequency: DAILY   Dispense Quantity: 90 tablet Refills: 2          Sig: TAKE 1 TABLET BY MOUTH EVERY DAY         Start Date: 08/27/24 End Date: --   Written Date: 08/27/24 Expiration Date: 08/27/25   Original Order: amiodarone (CORDARONE) 200 MG tablet [[id number]]   Providers    Authorizing Provider: James Keating MD  NPI: [id number]   Ordering User: James Keating MD          Pharmacy    [facility]/pharmacy #[id number] - [place] - [address] - P [phone] - F [phone]  [address] [place] [address]  Phone: [phone]  Fax: [phone]

## 2024-11-26 ENCOUNTER — OFFICE VISIT (OUTPATIENT)
Age: 88
End: 2024-11-26
Payer: MEDICARE

## 2024-11-26 VITALS
SYSTOLIC BLOOD PRESSURE: 124 MMHG | WEIGHT: 116 LBS | DIASTOLIC BLOOD PRESSURE: 54 MMHG | HEART RATE: 88 BPM | HEIGHT: 61 IN | BODY MASS INDEX: 21.9 KG/M2

## 2024-11-26 DIAGNOSIS — I10 ESSENTIAL HYPERTENSION, BENIGN: Primary | ICD-10-CM

## 2024-11-26 DIAGNOSIS — E78.2 MIXED HYPERLIPIDEMIA: ICD-10-CM

## 2024-11-26 DIAGNOSIS — R00.2 PALPITATIONS: ICD-10-CM

## 2024-11-26 PROCEDURE — 1036F TOBACCO NON-USER: CPT | Performed by: INTERNAL MEDICINE

## 2024-11-26 PROCEDURE — G8420 CALC BMI NORM PARAMETERS: HCPCS | Performed by: INTERNAL MEDICINE

## 2024-11-26 PROCEDURE — 1090F PRES/ABSN URINE INCON ASSESS: CPT | Performed by: INTERNAL MEDICINE

## 2024-11-26 PROCEDURE — 1159F MED LIST DOCD IN RCRD: CPT | Performed by: INTERNAL MEDICINE

## 2024-11-26 PROCEDURE — G8484 FLU IMMUNIZE NO ADMIN: HCPCS | Performed by: INTERNAL MEDICINE

## 2024-11-26 PROCEDURE — 99214 OFFICE O/P EST MOD 30 MIN: CPT | Performed by: INTERNAL MEDICINE

## 2024-11-26 PROCEDURE — 1123F ACP DISCUSS/DSCN MKR DOCD: CPT | Performed by: INTERNAL MEDICINE

## 2024-11-26 PROCEDURE — 1126F AMNT PAIN NOTED NONE PRSNT: CPT | Performed by: INTERNAL MEDICINE

## 2024-11-26 PROCEDURE — G8427 DOCREV CUR MEDS BY ELIG CLIN: HCPCS | Performed by: INTERNAL MEDICINE

## 2024-11-26 RX ORDER — HYDRALAZINE HYDROCHLORIDE 50 MG/1
50 TABLET, FILM COATED ORAL 2 TIMES DAILY
Qty: 180 TABLET | Refills: 3 | Status: SHIPPED | OUTPATIENT
Start: 2024-11-26

## 2024-11-26 RX ORDER — AMLODIPINE BESYLATE 5 MG/1
5 TABLET ORAL DAILY
Qty: 90 TABLET | Refills: 3 | Status: SHIPPED | OUTPATIENT
Start: 2024-11-26

## 2024-11-26 RX ORDER — AMIODARONE HYDROCHLORIDE 200 MG/1
200 TABLET ORAL DAILY
Qty: 90 TABLET | Refills: 2 | Status: SHIPPED | OUTPATIENT
Start: 2024-11-26

## 2024-11-26 RX ORDER — ESTRADIOL 10 UG/1
INSERT VAGINAL
COMMUNITY
Start: 2024-10-24

## 2024-11-26 RX ORDER — ISOSORBIDE MONONITRATE 30 MG/1
30 TABLET, EXTENDED RELEASE ORAL DAILY
Qty: 90 TABLET | Refills: 3 | Status: SHIPPED | OUTPATIENT
Start: 2024-11-26

## 2024-11-26 NOTE — PROGRESS NOTES
morning and at bedtime      docusate sodium (COLACE) 100 MG capsule Take 1 capsule by mouth every evening      aspirin 81 MG EC tablet Take by mouth daily      diclofenac sodium (VOLTAREN) 1 % GEL Apply topically 3 times daily as needed      ezetimibe (ZETIA) 10 MG tablet Take 1 tablet by mouth daily      latanoprost (XALATAN) 0.005 % ophthalmic solution Apply 1 drop to eye      polyethylene glycol (GLYCOLAX) 17 GM/SCOOP powder Take 17 g by mouth daily as needed       No current facility-administered medications for this visit.               Social History     Tobacco Use    Smoking status: Never    Smokeless tobacco: Never   Substance Use Topics    Alcohol use: No     Alcohol/week: 0.0 standard drinks of alcohol              PHYSICAL EXAM:   BP (!) 124/54   Pulse 88   Ht 1.549 m (5' 1\")   Wt 52.6 kg (116 lb)   BMI 21.92 kg/m²    Constitutional: Oriented to person, place, and time. Appears well-developed and well-nourished.   Head: Normocephalic and atraumatic.   Neck: Neck supple.   Cardiovascular: Normal rate and regular rhythm with 2/6 melvin murmur -No JVP  Pulmonary/Chest: Breath sounds normal.   Abdominal: Soft.   Musculoskeletal: No edema.   Neurological: Alert and oriented to person, place, and time.   Skin: Skin is warm and dry.   Psychiatric: Normal mood and affect.   Vitals reviewed.      Wt Readings from Last 3 Encounters:   11/26/24 52.6 kg (116 lb)   03/14/24 55.2 kg (121 lb 12.8 oz)   08/30/23 52.6 kg (116 lb)       Medical problems and test results were reviewed with the patient today.       ASSESSMENT and PLAN    1. Essential hypertension, benign  Stable.Continue norvasc and hydralazine      2. Mixed hyperlipidemia  Stable.Continue zetia      3. Palpitations/AF  Stable.Continue amio         Return in about 6 months (around 5/26/2025).         MARCIAL MONROY MD  11/26/2024  9:59 AM

## 2025-05-09 PROCEDURE — 93294 REM INTERROG EVL PM/LDLS PM: CPT | Performed by: INTERNAL MEDICINE

## 2025-05-09 PROCEDURE — 93296 REM INTERROG EVL PM/IDS: CPT | Performed by: INTERNAL MEDICINE

## 2025-05-28 ENCOUNTER — OFFICE VISIT (OUTPATIENT)
Age: 89
End: 2025-05-28
Payer: MEDICARE

## 2025-05-28 VITALS
DIASTOLIC BLOOD PRESSURE: 60 MMHG | HEIGHT: 61 IN | SYSTOLIC BLOOD PRESSURE: 130 MMHG | HEART RATE: 70 BPM | BODY MASS INDEX: 21.9 KG/M2 | WEIGHT: 116 LBS

## 2025-05-28 DIAGNOSIS — R00.2 PALPITATIONS: ICD-10-CM

## 2025-05-28 DIAGNOSIS — E78.2 MIXED HYPERLIPIDEMIA: ICD-10-CM

## 2025-05-28 DIAGNOSIS — I10 ESSENTIAL HYPERTENSION, BENIGN: Primary | ICD-10-CM

## 2025-05-28 PROCEDURE — 1036F TOBACCO NON-USER: CPT | Performed by: INTERNAL MEDICINE

## 2025-05-28 PROCEDURE — G8428 CUR MEDS NOT DOCUMENT: HCPCS | Performed by: INTERNAL MEDICINE

## 2025-05-28 PROCEDURE — 99214 OFFICE O/P EST MOD 30 MIN: CPT | Performed by: INTERNAL MEDICINE

## 2025-05-28 PROCEDURE — 1090F PRES/ABSN URINE INCON ASSESS: CPT | Performed by: INTERNAL MEDICINE

## 2025-05-28 PROCEDURE — G8420 CALC BMI NORM PARAMETERS: HCPCS | Performed by: INTERNAL MEDICINE

## 2025-05-28 PROCEDURE — 1126F AMNT PAIN NOTED NONE PRSNT: CPT | Performed by: INTERNAL MEDICINE

## 2025-05-28 PROCEDURE — 93000 ELECTROCARDIOGRAM COMPLETE: CPT | Performed by: INTERNAL MEDICINE

## 2025-05-28 PROCEDURE — 1123F ACP DISCUSS/DSCN MKR DOCD: CPT | Performed by: INTERNAL MEDICINE

## 2025-05-28 RX ORDER — LEVOTHYROXINE SODIUM 25 UG/1
25 TABLET ORAL DAILY
COMMUNITY

## 2025-05-28 NOTE — PROGRESS NOTES
[facility]  [address] [place] [address], [facility] [address]  [place] [address]  PHONE: [phone]      25    NAME:  Nataliya Hines  : 1928  MRN: [id number]       SUBJECTIVE:   Nataliya Hines is a 89 y.o. female seen for a follow up visit regarding the following:     Chief Complaint   Patient presents with    Hypertension         HPI:    No cp or holland. No orthopnea or pnd. No palpitations.  Increased falls. One episode of syncope after being overheated.     Past Medical History, Past Surgical History, Family history, Social History, and Medications were all reviewed with the patient today and updated as necessary.     Current Outpatient Medications   Medication Sig Dispense Refill    levothyroxine (SYNTHROID) 25 MCG tablet Take 1 tablet by mouth Daily      amiodarone (CORDARONE) 200 MG tablet Take 1 tablet by mouth daily 90 tablet 2    amLODIPine (NORVASC) 5 MG tablet Take 1 tablet by mouth daily 90 tablet 3    hydrALAZINE (APRESOLINE) 50 MG tablet Take 1 tablet by mouth 2 times daily 180 tablet 3    isosorbide mononitrate (IMDUR) 30 MG extended release tablet Take 1 tablet by mouth daily 90 tablet 3    nitroGLYCERIN (NITROSTAT) 0.4 MG SL tablet up to max of 3 total doses. If no relief after 1 dose, call 911. 25 tablet 3    acetaminophen (TYLENOL) 500 MG tablet Take 1 tablet by mouth every 6 hours as needed for Pain      gabapentin (NEURONTIN) 100 MG capsule Take 1 capsule by mouth nightly.      ondansetron (ZOFRAN-ODT) 8 MG TBDP disintegrating tablet TAKE 1 TABLET (8 MG) BY MOUTH 3 (THREE) TIMES A DAY AS NEEDED FOR NAUSEA OR VOMITING      pantoprazole (PROTONIX) 40 MG tablet Take 1 tablet by mouth every morning (before breakfast) 30 tablet 3    Multiple Vitamins-Minerals (PRESERVISION AREDS 2 PO) Take by mouth in the morning and at bedtime      docusate sodium (COLACE) 100 MG capsule Take 1 capsule by mouth every evening      aspirin 81 MG EC tablet Take by mouth daily      diclofenac sodium

## 2025-06-30 ENCOUNTER — CLINICAL SUPPORT (OUTPATIENT)
Age: 89
End: 2025-06-30

## 2025-06-30 DIAGNOSIS — R00.1 SINUS BRADYCARDIA: Primary | ICD-10-CM

## 2025-08-04 ENCOUNTER — TELEPHONE (OUTPATIENT)
Age: 89
End: 2025-08-04

## 2025-08-21 PROCEDURE — 93296 REM INTERROG EVL PM/IDS: CPT | Performed by: INTERNAL MEDICINE

## 2025-08-21 PROCEDURE — 93294 REM INTERROG EVL PM/LDLS PM: CPT | Performed by: INTERNAL MEDICINE

## (undated) DEVICE — SOLUTION IRRIGATION H2O 0797305] ICU MEDICAL INC]

## (undated) DEVICE — MICROPUNCTURE INTRODUCER SET SILHOUETTE TRANSITIONLESS WITH NITINOL WIRE GUIDE: Brand: MICROPUNCTURE

## (undated) DEVICE — JELLY,LUBE,STERILE,FLIP TOP,TUBE,4-OZ: Brand: MEDLINE

## (undated) DEVICE — SYR BULB 60ML IRRIGATION -- CONVERT TO ITEM 116413

## (undated) DEVICE — IMMOBILIZER SHLDR M L8X16.5IN R/L CANVS W/ WAIST STRP THMB

## (undated) DEVICE — MEDI-VAC NON-CONDUCTIVE SUCTION TUBING: Brand: CARDINAL HEALTH

## (undated) DEVICE — CATH URETH FOL 2W SH 18FRX5ML --

## (undated) DEVICE — DRESSING POSTOP AG PRISMASEAL 3.5X6IN

## (undated) DEVICE — APPLICATOR BNDG 1MM ADH PREMIERPRO EXOFIN

## (undated) DEVICE — SUTURE V-LOC 180 SZ 2-0 L9IN ABSRB VLT GS-21 L37MM 1/2 CIR VLOCM0345

## (undated) DEVICE — SUT PROL 2-0 30IN CT2 BLU --

## (undated) DEVICE — SLIM BODY SKIN STAPLER: Brand: APPOSE ULC

## (undated) DEVICE — ADHESIVE SKIN CLSR 0.7ML TOP DERMBND ADV

## (undated) DEVICE — 2000CC GUARDIAN II: Brand: GUARDIAN

## (undated) DEVICE — INTRODUCER SHTH L13CM OD7FR SH ORNG HUB SEAMLESS SAFSHTH

## (undated) DEVICE — PACKING GZ W2INXL6FT WVN COT VAG RADPQ

## (undated) DEVICE — CATH URET 5FRX70CM W/OPN END -- BX/20

## (undated) DEVICE — PLASMABLADE X PS210-030S-LIGHT 3.0SL: Brand: PLASMABLADE™ X

## (undated) DEVICE — SUTURE V-LOC 90 3-0 L9IN ABSRB VLT L26MM V-20 1/2 CIR TAPR VLOCM0644

## (undated) DEVICE — RING RETRCTR FIG 8 32.5X18.3CM -- PLAS STRL W/2 CATH CLIPS

## (undated) DEVICE — CYSTO/BLADDER IRRIGATION SET, REGULATING CLAMP

## (undated) DEVICE — BUTTON SWITCH PENCIL BLADE ELECTRODE, HOLSTER: Brand: EDGE

## (undated) DEVICE — SUTURE VCRL SZ 2-0 L27IN ABSRB UD L26MM CT-2 1/2 CIR J269H

## (undated) DEVICE — SUTURE TICRON SZ 0 L36IN NONABSORBABLE BLU CV 300 L35MM 1 2 8886339361

## (undated) DEVICE — LITHOTOMY: Brand: MEDLINE INDUSTRIES, INC.

## (undated) DEVICE — CARDINAL HEALTH FLEXIBLE LIGHT HANDLE COVER: Brand: CARDINAL HEALTH

## (undated) DEVICE — NEEDLE HYPO 18GA L1.5IN PNK S STL HUB POLYPR SHLD REG BVL

## (undated) DEVICE — SOLUTION IV 1000ML 0.9% SOD CHL

## (undated) DEVICE — TRAY CATH 16F DRN BG LTX -- CONVERT TO ITEM 363158

## (undated) DEVICE — REM POLYHESIVE ADULT PATIENT RETURN ELECTRODE: Brand: VALLEYLAB

## (undated) DEVICE — NEEDLE HYPO 25GA L5/8IN ORNG HUB S STL LATCH BVL UP

## (undated) DEVICE — HOOK RETRCT L5MM E SHRP SELF RET SYS LONE STAR

## (undated) DEVICE — SUTURE PDS II SZ 2-0 L27IN ABSRB VLT L26MM CT-2 1/2 CIR Z333H

## (undated) DEVICE — DRAPE,LITHOTOMY,STERILE: Brand: MEDLINE

## (undated) DEVICE — TRAY PREP DRY W/ PREM GLV 2 APPL 6 SPNG 2 UNDPD 1 OVERWRAP

## (undated) DEVICE — MEDI-VAC YANKAUER SUCTION HANDLE W/BULBOUS TIP: Brand: CARDINAL HEALTH

## (undated) DEVICE — COVER,MAYO STAND,STERILE: Brand: MEDLINE

## (undated) DEVICE — KENDALL SCD EXPRESS SLEEVES, KNEE LENGTH, MEDIUM: Brand: KENDALL SCD